# Patient Record
(demographics unavailable — no encounter records)

---

## 2025-03-19 NOTE — END OF VISIT
[FreeTextEntry3] : 30 year old male presented initially with cardiogenic shock and acute on chronic RH failure in setting of severe Group 1 PAH / PVOD 11/22/2024. Listed for bilateral lung transplant on 12/20/24. Underwent bilateral LT 1/31/2025. Discharged from index admission 3/17/25. Clinically doing well. Saturating well on room air.  Lung Transplant Recipient / Immunosuppression Tacrolimus 0.5 mg BID (Target 10 - 12) Cellcept 500 mg BID (reduced in setting of COVID). Can increase once RVP negative. Prednisone 17.5 mg daily TBBX 3/4/25 A0Bx, negative C4d DSA 3/4/25 - 0% Plan for CT suture removal at next visit  OI PPX: Bactrim, Valcyte (CMV negative 3/17/25), Posaconazole. Repeat monthly levels (4/7/25). COVID: Completed course of Remdesivir. Repeat RVP 3/24.  LTBI: INH/B6. Transplant ID follow up L IJ Clot: Last UE/LE Duplex negative 3/12/25. Eliquis 2.5 mg BID. Stop after 3 months (5/12/25) GERD: Aspiration precautions [Time Spent: ___ minutes] : I have spent [unfilled] minutes of time on the encounter which excludes teaching and separately reported services.

## 2025-03-19 NOTE — ASSESSMENT
[FreeTextEntry1] : 30yo male newly dx with PAH & RV failure, former smoker. S/P Bilateral Lung Transplant 1/31/2025   #IS GOAL 10-14  Tacrolimus 0.5 mg (Level 3/19/25: ****) - Prednisone 17.5mg for wound healing - Cellcept 750 mg BID  #OI - Valcyte 450mg BID - Bactrim SS daily -Posaconazole 200 mg BID  #GI Hx of GERD - Protonix 40 mg daily  #HEM Left IJ DVT Plan: discontinue after 3 months (5/13/25) - Eliquis 2.5 mg BID, started 2/13/25   #TB Latent TB therapy - Isoniazid 300 mg daily - Pyridoxine 50 mg daily       FOLLOW UP - Routine weekly labs 3/24/25 - DSA/Allosure to be drawn 4/9/25 - 3-Month bronch 4/17/25 - Vascular appointment pending - UE duplex to be repeated 5/13/25     All questions answered, used teach back method, patient verbalized understanding.   Above discussed with Dr. Brito

## 2025-03-19 NOTE — REASON FOR VISIT
[Follow-Up] : a follow-up visit [TextBox_44] : S/P Bilateral lung Transplant 1/3/25 [Family Member] : family member [Home] : at home, [unfilled] , at the time of the visit. [Medical Office: ([facility])___] : at the medical office located in  [Telehealth (audio & video)] : This visit was provided via telehealth using real-time 2-way audio visual technology. [Verbal consent obtained from patient] : the patient, [unfilled]

## 2025-03-19 NOTE — HISTORY OF PRESENT ILLNESS
[TextBox_4] : 31 year old male PVOD associated with right heart failure who presented with acute hypoxic respiratory failure and cardiogenic shock. Admitted to CCU and treated with Shankar and milrinone. Initiated on Epoprostenol  CT Chest showed centrilobular ground-glass nodules, interlobular septal thickening, and mediastinal lymphadenopathy. Course complicated by pneumothorax following placement of PAC. He underwent bronchoscopy and EBUS on 12/10/24. Lymph node negative for malignancy.   Following stabilization in the ICU patient was transferred to RCU 24. Tested positive for RSV 24 s/p course of Ribavirin. Evaluated by dental 24 for jaw pain and found to have inflamed gingiva around #17 distal and lingual, with generalized plaque accumulation and gross debris/plaque s/p irrigation and cleaning. No active infection noted. RRT on 24 in setting of difficulty with Flolan infusion running through PICC Line. Flolan was subsequently changed to R IJ but then became symptomatic with tachycardia, flushing and chest pain. Patient was transferred to MICU for further monitoring. Transferred back to RCU 24. Left anterior chest wall Pandya catheter placemed for Flolan infusion 24.  Listed:  Surgery: BILATERAL LUNG TRANSPLANT 25   Donor:  CMV + / EBV + / Toxo - , PHS risk Y/N Recipient: CMV +  / EBV +  / Toxo -  Induction: simluect 20mg x2,, medrol 1 g  Total Ischemic Time: RIGHT LUN" LEFT LUN" Extubated:25  Hospital Course: Bilateral lung transplant 25. Extubated on 25. Postoperative course notable for right main stem bronchus with necrotic material and persistent air leak requiring prolonged chest tube placement. Fluid cultures 25 grew penicillium and paecilomyces for which he is on posaconazole. Also developed an acute L IJ DVT treated with lose dose Eliquis. He also tested positive for COVID initially on 3/4/25 treated with a 5 day course of Remdesivir. He remained asymptomatic. Patient discharged home 3/17/25.  TELE-Visit 3/19/2025:  Initial visit is remote given positive COVID. He remains asymptomatic. Feels somewhat tired after climbing stairs at home. Also notes some chest tightness around the chest, which was present during his hospital stay. He is taking his medications as instructed using his pill box, which he had on him during the visit. He is taking strict aspiration precautions, which is also being enforced by his family. He is staying with his brother and designated caretaker Jayson, who is also present for the visit. He is ambulating daily without issue. Patient has vitals available for review, which were checked at home. Reported SpO2 97 - 99% on room air. /75. T 97.4 F. Wt 129 lbs.   DONOR CULTURES:  CHECK UNET POD 1,3,7,10,14 2025: respiratory cx: pseudomonas fluoresecens 25 blood cx: ngtd 25 R BAL: <10k staph aureus 25 L BAL: <10k staph aureus 25 urine cx: ngtd 25: bld cx: staph epi?? - I'm not seeing this on unet?  CULTURES: : OR: rare yeast, rare mold, + pseudomonas flurosecens : OR: candida parapsilosis, s/p  caspofungin : OR: rare penicillum  25 body fluid: rare candida, rare pseudomonas fluorescens 25 body fluid cx: rare candida parapsilosis, rare penicillium, rare paecilomyces 25: BAL: <10k growth 25 BAL: ngtd 25: NGTD 3/4/25: BAL + COVID (remdesivir 3/5 - 3/10) 3/7/25: RVP + COVID 3/13: RVP + COVID   TBBX: 1 MONTH: 3/4/25: A0Bx, C4D neg 3 MONTH: 2025 scheduled 8am 6 MONTH  9 MONTH  12 MONTH   DSA: 1 WEEK: (high risk): 25: 0%, NO DSA 2 WEEK: (high risk): 25: cPRA 0% NO DSA >2000 Late 1 month SENT 3/4/25; no Dsa, CPRA0% 3 MONTH: prior to  ** 6 MONTH  9 MONTH  12 MONTH  ALLOSURE: Late 1 month: SENT 3/4/25: 0.19% 3 MONTH: prior to  ** 6 MONTH  9 MONTH  12 MONTH   SPIROMETRY  inpatient	 FVC 2.1	   FEV1 1.82 2025 inpatient	 FVC 2.21   FEV1 2.08 2025 inpatient	 FVC 1.54   FEV1 1.16 2025 inpatient	 FVC 2.07    FEV1 2.05 3/3/2025	inpatient	FVC 1.94	   FEV1 1.77 3/10/2025 inpatient	 FVC 2.04    FEV1 1.85 3/13/2025 inpatient	 FVC 2.21    FEV1 2.06 3/17/2025 inpatient	 FVC 2.06    FEV1 1.94

## 2025-03-20 NOTE — ASSESSMENT
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 S/P Bilateral Lung Transplant 1/31/2025   #IS GOAL 10-14  Tacrolimus 0.5 mg BID (Level 3/24/25: ****) - Prednisone 17.5mg for wound healing - Cellcept 750 mg BID  #OI - Valcyte 450mg BID - Bactrim SS daily -Posaconazole 200 mg BID  #GI Hx of GERD - Protonix 40 mg daily  #HEM Left IJ DVT Plan: discontinue after 3 months (5/13/25) - Eliquis 2.5 mg BID, started 2/13/25   #TB Latent TB therapy - Isoniazid 300 mg daily - Pyridoxine 50 mg daily     FOLLOW UP - Routine weekly labs 3/31/2025 - [place] reviewed, ***** - DSA/Allosure to be drawn 4/9/25 - 3-Month bronch 4/17/25 - Vascular appointment pending - UE duplex to be repeated 5/13/25   RTC in 1 week with [place]  All questions answered, used teach back method, patient verbalized understanding.  Above discussed with Dr. Solorio

## 2025-03-20 NOTE — ASSESSMENT
[FreeTextEntry1] : 30yo male newly dx with PAH & RV failure, former smoker. S/P Bilateral Lung Transplant 1/31/2025   #IS GOAL 10-14  Tacrolimus 0.5 mg BID (Level 3/24/25: ****) - Prednisone 17.5mg for wound healing - Cellcept 750 mg BID  #OI - Valcyte 450mg BID - Bactrim SS daily -Posaconazole 200 mg BID  #GI Hx of GERD - Protonix 40 mg daily  #HEM Left IJ DVT Plan: discontinue after 3 months (5/13/25) - Eliquis 2.5 mg BID, started 2/13/25   #TB Latent TB therapy - Isoniazid 300 mg daily - Pyridoxine 50 mg daily     FOLLOW UP - Routine weekly labs 3/31/2025 - [place] reviewed, ***** - DSA/Allosure to be drawn 4/9/25 - 3-Month bronch 4/17/25 - Vascular appointment pending - UE duplex to be repeated 5/13/25   RTC in 1 week with [place]  All questions answered, used teach back method, patient verbalized understanding.  Above discussed with Dr. Solorio

## 2025-03-20 NOTE — HISTORY OF PRESENT ILLNESS
[TextBox_4] : 31 year old male PVOD associated with right heart failure who presented with acute hypoxic respiratory failure and cardiogenic shock. Admitted to CCU and treated with Shankar and milrinone. Initiated on Epoprostenol  CT Chest showed centrilobular ground-glass nodules, interlobular septal thickening, and mediastinal lymphadenopathy. Course complicated by pneumothorax following placement of PAC. He underwent bronchoscopy and EBUS on 12/10/24. Lymph node negative for malignancy.   Following stabilization in the ICU patient was transferred to RCU 24. Tested positive for RSV 24 s/p course of Ribavirin. Evaluated by dental 24 for jaw pain and found to have inflamed gingiva around #17 distal and lingual, with generalized plaque accumulation and gross debris/plaque s/p irrigation and cleaning. No active infection noted. RRT on 24 in setting of difficulty with Flolan infusion running through PICC Line. Flolan was subsequently changed to R IJ but then became symptomatic with tachycardia, flushing and chest pain. Patient was transferred to MICU for further monitoring. Transferred back to RCU 24. Left anterior chest wall Pandya catheter placemed for Flolan infusion 24.  Listed:  Surgery: BILATERAL LUNG TRANSPLANT 25   Donor:  CMV + / EBV + / Toxo - , PHS risk Y/N Recipient: CMV +  / EBV +  / Toxo -  Induction: simluect 20mg x2,, medrol 1 g  Total Ischemic Time: RIGHT LUN" LEFT LUN" Extubated:25  Hospital Course: Bilateral lung transplant 25. Extubated on 25. Postoperative course notable for right main stem bronchus with necrotic material and persistent air leak requiring prolonged chest tube placement. Fluid cultures 25 grew penicillium and paecilomyces for which he is on posaconazole. Also developed an acute L IJ DVT treated with lose dose Eliquis. He also tested positive for COVID initially on 3/4/25 treated with a 5 day course of Remdesivir. He remained asymptomatic. Patient discharged home 3/17/25.  TELE-Visit 3/19/2025: Initial visit is remote given positive COVID. He remains asymptomatic. Feels somewhat tired after climbing stairs at home. Also notes some chest tightness around the chest, which was present during his hospital stay. He is taking his medications as instructed using his pill box, which he had on him during the visit. He is taking strict aspiration precautions, which is also being enforced by his family. He is staying with his brother and designated caretaker Jayson, who is also present for the visit. He is ambulating daily without issue. Patient has vitals available for review, which were checked at home. Reported SpO2 97 - 99% on room air. /75. T 97.4 F. Wt 129 lbs.  CLINIC 3/26/2025: Patient presents for routine follow up appointment post hospital discharge. Reports******   DONOR CULTURES:  CHECK UNET POD 1,3,7,10,14 2025: respiratory cx: pseudomonas fluoresecens 25 blood cx: ngtd 25 R BAL: <10k staph aureus 25 L BAL: <10k staph aureus 25 urine cx: ngtd 25: bld cx: staph epi?? - I'm not seeing this on unet?  CULTURES: : OR: rare yeast, rare mold, + pseudomonas flurosecens : OR: candida parapsilosis, s/p  caspofungin : OR: rare penicillum  25 body fluid: rare candida, rare pseudomonas fluorescens 25 body fluid cx: rare candida parapsilosis, rare penicillium, rare paecilomyces 25: BAL: <10k growth 25 BAL: ngtd 25: NGTD 3/4/25: BAL + COVID (remdesivir 3/5 - 3/10) 3/7/25: RVP + COVID 3/13: RVP + COVID   TBBX: 1 MONTH: 3/4/25: A0Bx, C4D neg 3 MONTH: 2025 scheduled 8am 6 MONTH  9 MONTH  12 MONTH   DSA: 1 WEEK: (high risk): 25: 0%, NO DSA 2 WEEK: (high risk): 25: cPRA 0% NO DSA >2000 Late 1 month SENT 3/4/25; no Dsa, CPRA0% 3 MONTH: prior to  ** 6 MONTH  9 MONTH  12 MONTH  ALLOSURE: Late 1 month: SENT 3/4/25: 0.19% 3 MONTH: prior to  ** 6 MONTH  9 MONTH  12 MONTH   SPIROMETRY  inpatient	 FVC 2.1	   FEV1 1.82 2025 inpatient	 FVC 2.21   FEV1 2.08 2025 inpatient	 FVC 1.54   FEV1 1.16 2025 inpatient	 FVC 2.07    FEV1 2.05 3/3/2025	inpatient	FVC 1.94	   FEV1 1.77 3/10/2025 inpatient	 FVC 2.04    FEV1 1.85 3/13/2025 inpatient	 FVC 2.21    FEV1 2.06 3/17/2025 inpatient	 FVC 2.06    FEV1 1.94 3/26/2025 [facility]:   *************************

## 2025-03-30 NOTE — END OF VISIT
[FreeTextEntry3] : Seen and examined patient with NP/PA/ Fellow physician.  Independently verified the review of systems, physical examination, labs, radiological imaging. Also discussed with consultants to formulate eventual assessment and plan.    Assessment and plan:   Overall doing well from pulmonary perspective.  Asymptomatic.  Recently diagnosed COVID.  Spirometry stable.  Chest x-ray stable.  Tolerating transplant medications well. Sutures removed from chest tube sites Reports soreness in thyroid area-thyroid function normal.  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  Tylenol as needed  Matteo Sweeney MD, MPH  Lung Transplantation , Pulmonary and Critical care Medicine [facility]  [Time Spent: ___ minutes] : I have spent [unfilled] minutes of time on the encounter which excludes teaching and separately reported services.

## 2025-03-30 NOTE — ASSESSMENT
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 S/P Bilateral Lung Transplant 1/31/2025   #IS GOAL 10-14  Tacrolimus 0.5 mg BID (Level 3/24/25: 15.1) - Prednisone 17.5mg for wound healing - Cellcept 750 mg BID  #OI - Valcyte 450mg BID - Bactrim SS daily -Posaconazole 200 mg BID  #LUNG TXP - Culture 2/1/25 rare penicillium - Posa 200mg BID - Transplant ID appointment pending   #GI Hx of GERD - Protonix 40 mg daily  #HEM Left IJ DVT Plan: discontinue after 3 months (5/13/25) - Eliquis 2.5 mg BID, started 2/13/25   #TB Latent TB therapy - Isoniazid 300 mg daily - Pyridoxine 50 mg daily     FOLLOW UP - Routine weekly labs 3/31/2025 - [place] reviewed, improved -  Post COVID, DSA/Alllosre sent - DSA/Allosure to be repeated 4/16/25 - 3-Month bronch 4/17/25 - Vascular appointment pending - UE duplex to be repeated 5/13/25   RTC in 1 week with [place]  All questions answered, used teach back method, patient verbalized understanding.  Above discussed with Dr. Huston

## 2025-03-30 NOTE — END OF VISIT
[FreeTextEntry3] : Seen and examined patient with NP/PA/ Fellow physician.  Independently verified the review of systems, physical examination, labs, radiological imaging. Also discussed with consultants to formulate eventual assessment and plan.    Assessment and plan:   Overall doing well from pulmonary perspective.  Asymptomatic.  Recently diagnosed COVID.  Spirometry stable.  Chest x-ray stable.  Tolerating transplant medications well. Sutures removed from chest tube sites Reports soreness in thyroid area-thyroid function normal.  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  Tylenol as needed  Matteo Sweeney MD, MPH  Lung Transplantation , Pulmonary and Critical care Medicine [facility]  [Time Spent: ___ minutes] : I have spent [unfilled] minutes of time on the encounter which excludes teaching and separately reported services.

## 2025-03-30 NOTE — HISTORY OF PRESENT ILLNESS
[TextBox_4] : 31 year old male PVOD associated with right heart failure who presented with acute hypoxic respiratory failure and cardiogenic shock. Admitted to CCU and treated with Shankar and milrinone. Initiated on Epoprostenol  CT Chest showed centrilobular ground-glass nodules, interlobular septal thickening, and mediastinal lymphadenopathy. Course complicated by pneumothorax following placement of PAC. He underwent bronchoscopy and EBUS on 12/10/24. Lymph node negative for malignancy.   Following stabilization in the ICU patient was transferred to RCU 24. Tested positive for RSV 24 s/p course of Ribavirin. Evaluated by dental 24 for jaw pain and found to have inflamed gingiva around #17 distal and lingual, with generalized plaque accumulation and gross debris/plaque s/p irrigation and cleaning. No active infection noted. RRT on 24 in setting of difficulty with Flolan infusion running through PICC Line. Flolan was subsequently changed to R IJ but then became symptomatic with tachycardia, flushing and chest pain. Patient was transferred to MICU for further monitoring. Transferred back to RCU 24. Left anterior chest wall Pandya catheter placemed for Flolan infusion 24.  Listed:  Surgery: BILATERAL LUNG TRANSPLANT 25   Donor:  CMV + / EBV + / Toxo - , PHS risk Y/N Recipient: CMV +  / EBV +  / Toxo -  Induction: simluect 20mg x2,, medrol 1 g  Total Ischemic Time: RIGHT LUN" LEFT LUN" Extubated:25  Hospital Course: Bilateral lung transplant 25. Extubated on 25. Postoperative course notable for right main stem bronchus with necrotic material and persistent air leak requiring prolonged chest tube placement. Fluid cultures 25 grew penicillium and paecilomyces for which he is on posaconazole. Also developed an acute L IJ DVT treated with lose dose Eliquis. He also tested positive for COVID initially on 3/4/25 treated with a 5 day course of Remdesivir. He remained asymptomatic. Patient discharged home 3/17/25.  TELE-Visit 3/19/2025: Initial visit is remote given positive COVID. He remains asymptomatic. Feels somewhat tired after climbing stairs at home. Also notes some chest tightness around the chest, which was present during his hospital stay. He is taking his medications as instructed using his pill box, which he had on him during the visit. He is taking strict aspiration precautions, which is also being enforced by his family. He is staying with his brother and designated caretaker Jayson, who is also present for the visit. He is ambulating daily without issue. Patient has vitals available for review, which were checked at home. Reported SpO2 97 - 99% on room air. /75. T 97.4 F. Wt 129 lbs.  CLINIC 3/26/2025: Patient presents to clinic for routine follow up appointment post hospital discharge.  Recent COVID infection, last RVP negative, SPI improved, best to date, denies respiratory complications. Complained of sore throat/esophageal pain X 3 days. Thyroid assessed, appears normal, thyroid panel and DSA/Allosure drawn. Chest tube sutures removed, incisions healing well, no signs of infection noted. Medications reviewed with patient and brother, patient and brother verbalized understanding.    DONOR CULTURES:  CHECK UNET POD 1,3,7,10,14 2025: respiratory cx: pseudomonas fluoresecens 25 blood cx: ngtd 25 R BAL: <10k staph aureus 25 L BAL: <10k staph aureus 25 urine cx: ngtd 25: bld cx: staph epi?? - I'm not seeing this on unet?  CULTURES: : OR: rare yeast, rare mold, + pseudomonas flurosecens : OR: candida parapsilosis, s/p  caspofungin : OR: rare penicillum  25 body fluid: rare candida, rare pseudomonas fluorescens 25 body fluid cx: rare candida parapsilosis, rare penicillium, rare paecilomyces 25: BAL: <10k growth 25 BAL: ngtd 25: NGTD 3/4/25: BAL + COVID (remdesivir 3/5 - 3/10) 3/7/25: RVP + COVID 3/13: RVP + COVID   TBBX: 1 MONTH: 3/4/25: A0Bx, C4D neg 3 MONTH: 2025 scheduled 8am 6 MONTH  9 MONTH  12 MONTH   DSA: 1 WEEK: (high risk): 25: 0%, NO DSA 2 WEEK: (high risk): 25: cPRA 0% NO DSA >2000 Late 1 month SENT 3/4/25; no Dsa, CPRA0% Repeated 3/26: pending 3 MONTH:  6 MONTH  9 MONTH  12 MONTH  ALLOSURE: Late 1 month: SENT 3/4/25: 0.19% Repeat 3/26: pending 3 MONTH:   6 MONTH  9 MONTH  12 MONTH   SPIROMETRY  inpatient	 FVC 2.1	   FEV1 1.82 2025 inpatient	 FVC 2.21   FEV1 2.08 2025 inpatient	 FVC 1.54   FEV1 1.16 2025 inpatient	 FVC 2.07    FEV1 2.05 3/3/2025	inpatient	FVC 1.94	   FEV1 1.77 3/10/2025 inpatient	 FVC 2.04    FEV1 1.85 3/13/2025 inpatient	 FVC 2.21    FEV1 2.06 3/17/2025 inpatient	 FVC 2.06    FEV1 1.94 3/26/2025 [facility] FVC 2.98   FEV1 2.92

## 2025-03-30 NOTE — ASSESSMENT
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 S/P Bilateral Lung Transplant 1/31/2025   #IS GOAL 10-14  Tacrolimus 0.5 mg BID (Level 3/24/25: 15.1) - Prednisone 17.5mg for wound healing - Cellcept 750 mg BID  #OI - Valcyte 450mg BID - Bactrim SS daily -Posaconazole 200 mg BID  #LUNG TXP - Culture 2/1/25 rare penicillium - Posa 200mg BID - Transplant ID appointment pending   #GI Hx of GERD - Protonix 40 mg daily  #HEM Left IJ DVT Plan: discontinue after 3 months (5/13/25) - Eliquis 2.5 mg BID, started 2/13/25   #TB Latent TB therapy - Isoniazid 300 mg daily - Pyridoxine 50 mg daily     FOLLOW UP - Routine weekly labs 3/31/2025 - [place] reviewed, improved -  Post COVID, DSA/Alllosre sent - DSA/Allosure to be repeated 4/16/25 - 3-Month bronch 4/17/25 - Vascular appointment pending - UE duplex to be repeated 5/13/25   RTC in 1 week with [place]  All questions answered, used teach back method, patient verbalized understanding.  Above discussed with Dr. Huston

## 2025-03-30 NOTE — ASSESSMENT
[FreeTextEntry1] : 30yo male newly dx with PAH & RV failure, former smoker. S/P Bilateral Lung Transplant 1/31/2025   #IS GOAL 10-14  Tacrolimus 0.5 mg BID (Level 3/24/25: 15.1) - Prednisone 17.5mg for wound healing - Cellcept 750 mg BID  #OI - Valcyte 450mg BID - Bactrim SS daily -Posaconazole 200 mg BID  #LUNG TXP - Culture 2/1/25 rare penicillium - Posa 200mg BID - Transplant ID appointment pending   #GI Hx of GERD - Protonix 40 mg daily  #HEM Left IJ DVT Plan: discontinue after 3 months (5/13/25) - Eliquis 2.5 mg BID, started 2/13/25   #TB Latent TB therapy - Isoniazid 300 mg daily - Pyridoxine 50 mg daily     FOLLOW UP - Routine weekly labs 3/31/2025 - [place] reviewed, improved -  Post COVID, DSA/Alllosre sent - DSA/Allosure to be repeated 4/16/25 - 3-Month bronch 4/17/25 - Vascular appointment pending - UE duplex to be repeated 5/13/25   RTC in 1 week with [place]  All questions answered, used teach back method, patient verbalized understanding.  Above discussed with Dr. Huston

## 2025-03-30 NOTE — ASSESSMENT
[FreeTextEntry1] : 30yo male newly dx with PAH & RV failure, former smoker. S/P Bilateral Lung Transplant 1/31/2025   #IS GOAL 10-14  Tacrolimus 0.5 mg BID (Level 3/24/25: 15.1) - Prednisone 17.5mg for wound healing - Cellcept 750 mg BID  #OI - Valcyte 450mg BID - Bactrim SS daily -Posaconazole 200 mg BID  #LUNG TXP - Culture 2/1/25 rare penicillium - Posa 200mg BID - Transplant ID appointment pending   #GI Hx of GERD - Protonix 40 mg daily  #HEM Left IJ DVT Plan: discontinue after 3 months (5/13/25) - Eliquis 2.5 mg BID, started 2/13/25   #TB Latent TB therapy - Isoniazid 300 mg daily - Pyridoxine 50 mg daily     FOLLOW UP - Routine weekly labs 3/31/2025 - [place] reviewed, improved -  Post COVID, DSA/Alllosre sent - DSA/Allosure to be repeated 4/16/25 - 3-Month bronch 4/17/25 - Vascular appointment pending - UE duplex to be repeated 5/13/25   RTC in 1 week with [place]  All questions answered, used teach back method, patient verbalized understanding.  Above discussed with Dr. Huston

## 2025-03-30 NOTE — END OF VISIT
[FreeTextEntry3] : Seen and examined patient with NP/PA/ Fellow physician.  Independently verified the review of systems, physical examination, labs, radiological imaging. Also discussed with consultants to formulate eventual assessment and plan.    Assessment and plan:   Overall doing well from pulmonary perspective.  Asymptomatic.  Recently diagnosed COVID.  Spirometry stable.  Chest x-ray stable.  Tolerating transplant medications well. Sutures removed from chest tube sites Reports soreness in thyroid area-thyroid function normal.  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  Tylenol as needed  Matteo Sweeney MD, MPH  Lung Transplantation , Pulmonary and Critical care Medicine [facility]  [Time Spent: ___ minutes] : I have spent [unfilled] minutes of time on the encounter which excludes teaching and separately reported services.

## 2025-03-30 NOTE — END OF VISIT
[FreeTextEntry3] : Seen and examined patient with NP/PA/ Fellow physician.  Independently verified the review of systems, physical examination, labs, radiological imaging. Also discussed with consultants to formulate eventual assessment and plan.    Assessment and plan:   Overall doing well from pulmonary perspective.  Asymptomatic.  Recently diagnosed COVID.  Spirometry stable.  Chest x-ray stable.  Tolerating transplant medications well. Sutures removed from chest tube sites Reports soreness in thyroid area-thyroid function normal.  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  Tylenol as needed  Matteo Sweeney MD, MPH  Lung Transplantation , Pulmonary and Critical care Medicine [facility]  [Time Spent: ___ minutes] : I have spent [unfilled] minutes of time on the encounter which excludes teaching and separately reported services.

## 2025-04-05 NOTE — END OF VISIT
[FreeTextEntry3] : Seen and examined patient with NP/PA/ Fellow physician.  Independently verified the review of systems, physical examination, labs, radiological imaging. Also discussed with consultants to formulate eventual assessment and plan.    Assessment and plan:  Reports sore throat with several family members ill son with fever.  Home spirometry stable.  Chest x-ray stable.  Virus PCR with COVID virus and enterovirus rhinovirus.  COVID PCR likely persistence from previous infection.  Treat with prednisone taper and antibiotic.  Follow spirometry closely.  Tolerating medications well, but reports occasional tingling in feet  Thyroid area pain better  Family arrived from [facility]-feels happier  Return to clinic weekly  Matteo Sweeney MD, MPH  Lung Transplantation , Pulmonary and Critical care Medicine [facility] [Time Spent: ___ minutes] : I have spent [unfilled] minutes of time on the encounter which excludes teaching and separately reported services.

## 2025-04-05 NOTE — END OF VISIT
[FreeTextEntry3] : Seen and examined patient with NP/PA/ Fellow physician.  Independently verified the review of systems, physical examination, labs, radiological imaging. Also discussed with consultants to formulate eventual assessment and plan.    Assessment and plan:  Reports sore throat with several family members ill son with fever.  Home spirometry stable.  Chest x-ray stable.  Virus PCR with COVID virus and enterovirus rhinovirus.  COVID PCR likely persistence from previous infection.  Treat with prednisone taper and antibiotic.  Follow spirometry closely.  Tolerating medications well, but reports occasional tingling in feet  Thyroid area pain better  Family arrived from [facility]-feels happier  Return to clinic weekly  Matteo Sweeney MD, MPH  Lung Transplantation , Pulmonary and Critical care Medicine [facility] [Time Spent: ___ minutes] : I have spent [unfilled] minutes of time on the encounter which excludes teaching and separately reported services.

## 2025-04-05 NOTE — ASSESSMENT
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 S/P Bilateral Lung Transplant 1/31/2025   #IS GOAL 10-14  Tacrolimus 0.5 mg BID (Level 3/31/25: 13.2) - Prednisone 17.5mg for wound healing - Cellcept 750 mg BID  #OI - Valcyte 450mg BID - Bactrim SS daily -Posaconazole 200 mg BID  #LUNG TXP - Culture 2/1/25 rare penicillium - Posa 200mg BID - Transplant ID appointment pending   #GI Hx of GERD - Protonix 40 mg daily  #HEM Left IJ DVT Plan: discontinue after 3 months (5/13/25) - Eliquis 2.5 mg BID, started 2/13/25   #TB Latent TB therapy - Isoniazid 300 mg daily - Pyridoxine 50 mg daily     FOLLOW UP - Routine weekly labs 4/7/2025 - [place] reviewed, slight decline - DSA/Allosure to be repeated 4/16/25 - 3-Month bronch 4/17/25 - Vascular appointment pending - UE duplex to be repeated 5/13/25   RTC in 1 week with Mendez& CXR  All questions answered, used teach back method, patient verbalized understanding.  Above discussed with Dr. Huston

## 2025-04-05 NOTE — HISTORY OF PRESENT ILLNESS
[TextBox_4] : 31 year old male PVOD associated with right heart failure who presented with acute hypoxic respiratory failure and cardiogenic shock. Admitted to CCU and treated with Shankar and milrinone. Initiated on Epoprostenol  CT Chest showed centrilobular ground-glass nodules, interlobular septal thickening, and mediastinal lymphadenopathy. Course complicated by pneumothorax following placement of PAC. He underwent bronchoscopy and EBUS on 12/10/24. Lymph node negative for malignancy.   Following stabilization in the ICU patient was transferred to RCU 24. Tested positive for RSV 24 s/p course of Ribavirin. Evaluated by dental 24 for jaw pain and found to have inflamed gingiva around #17 distal and lingual, with generalized plaque accumulation and gross debris/plaque s/p irrigation and cleaning. No active infection noted. RRT on 24 in setting of difficulty with Flolan infusion running through PICC Line. Flolan was subsequently changed to R IJ but then became symptomatic with tachycardia, flushing and chest pain. Patient was transferred to MICU for further monitoring. Transferred back to RCU 24. Left anterior chest wall Pandya catheter placemed for Flolan infusion 24.  Listed:  Surgery: BILATERAL LUNG TRANSPLANT 25   Donor:  CMV + / EBV + / Toxo - , PHS risk Y/N Recipient: CMV +  / EBV +  / Toxo -  Induction: simluect 20mg x2,, medrol 1 g  Total Ischemic Time: RIGHT LUN" LEFT LUN" Extubated:25  Hospital Course: Bilateral lung transplant 25. Extubated on 25. Postoperative course notable for right main stem bronchus with necrotic material and persistent air leak requiring prolonged chest tube placement. Fluid cultures 25 grew penicillium and paecilomyces for which he is on posaconazole. Also developed an acute L IJ DVT treated with lose dose Eliquis. He also tested positive for COVID initially on 3/4/25 treated with a 5 day course of Remdesivir. He remained asymptomatic. Patient discharged home 3/17/25.  TELE-Visit 3/19/2025: Initial visit is remote given positive COVID. He remains asymptomatic. Feels somewhat tired after climbing stairs at home. Also notes some chest tightness around the chest, which was present during his hospital stay. He is taking his medications as instructed using his pill box, which he had on him during the visit. He is taking strict aspiration precautions, which is also being enforced by his family. He is staying with his brother and designated caretaker Jayson, who is also present for the visit. He is ambulating daily without issue. Patient has vitals available for review, which were checked at home. Reported SpO2 97 - 99% on room air. /75. T 97.4 F. Wt 129 lbs.  CLINIC 3/26/2025: Patient presents to clinic for routine follow up appointment post hospital discharge.  Recent COVID infection, last RVP negative, SPI improved, best to date, denies respiratory complications. Complained of sore throat/esophageal pain X 3 days. Thyroid assessed, appears normal, thyroid panel and DSA/Allosure drawn. Chest tube sutures removed, incisions healing well, no signs of infection noted. Medications reviewed with patient and brother, patient and brother verbalized understanding.   CLINIC 25: Slight decline in spirometry, not concerning as home sandy trend is stable, DSA/Allosure repeated for further surveillance. Patient complained of sore thrat X1-2 days, denies sick contact, RVP swab sent. Patient also complained of numbness and tingling in hands and fingers, will reassess next week and consider adding gabapentin.  IS/Oi medications are stable; no changes this week. Routine labs to be repeated on Monday    DONOR CULTURES:  CHECK UNET POD 1,3,7,10,14 2025: respiratory cx: pseudomonas fluoresecens 25 blood cx: ngtd 25 R BAL: <10k staph aureus 25 L BAL: <10k staph aureus 25 urine cx: ngtd 25: bld cx: staph epi?? - I'm not seeing this on unet?  CULTURES: : OR: rare yeast, rare mold, + pseudomonas flurosecens : OR: candida parapsilosis, s/p  caspofungin : OR: rare penicillum  25 body fluid: rare candida, rare pseudomonas fluorescens 25 body fluid cx: rare candida parapsilosis, rare penicillium, rare paecilomyces 25: BAL: <10k growth 25 BAL: ngtd 25: NGTD 3/4/25: BAL + COVID (remdesivir 3/5 - 3/10) 3/7/25: RVP + COVID 3/13: RVP + COVID   TBBX: 1 MONTH: 3/4/25: A0Bx, C4D neg 3 MONTH: 2025 scheduled 8am 6 MONTH  9 MONTH  12 MONTH   DSA: 1 WEEK: (high risk): 25: 0%, NO DSA 2 WEEK: (high risk): 25: cPRA 0% NO DSA >2000 Late 1 month SENT 3/4/25; no Dsa, CPRA0% Repeated 3/26: 0% 3 MONTH:  6 MONTH  9 MONTH  12 MONTH  ALLOSURE: Late 1 month: SENT 3/4/25: 0.19% Repeat 3/26: 0.06% Repeated 2025 3 MONTH:   6 MONTH  9 MONTH  12 MONTH   SPIROMETRY  inpatient	 FVC 2.1	   FEV1 1.82 2025 inpatient	 FVC 2.21   FEV1 2.08 2025 inpatient	 FVC 1.54   FEV1 1.16 2025 inpatient	 FVC 2.07    FEV1 2.05 3/3/2025	inpatient	FVC 1.94	   FEV1 1.77 3/10/2025 inpatient	 FVC 2.04    FEV1 1.85 3/13/2025 inpatient	 FVC 2.21    FEV1 2.06 3/17/2025 inpatient	 FVC 2.06    FEV1 1.94 3/26/2025 [facility] FVC 2.98   FEV1 2.92 2025 [facility]   FVC 2.70   FEV1 2.63

## 2025-04-05 NOTE — ASSESSMENT
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 S/P Bilateral Lung Transplant 1/31/2025   #IS GOAL 10-14  Tacrolimus 0.5 mg BID (Level 3/31/25: 13.2) - Prednisone 17.5mg for wound healing - Cellcept 750 mg BID  #OI - Valcyte 450mg BID - Bactrim SS daily -Posaconazole 200 mg BID  #LUNG TXP - Culture 2/1/25 rare penicillium - Posa 200mg BID - Transplant ID appointment pending   #GI Hx of GERD - Protonix 40 mg daily  #HEM Left IJ DVT Plan: discontinue after 3 months (5/13/25) - Eliquis 2.5 mg BID, started 2/13/25   #TB Latent TB therapy - Isoniazid 300 mg daily - Pyridoxine 50 mg daily     FOLLOW UP - Routine weekly labs 4/7/2025 - [place] reviewed, slight decline - DSA/Allosure to be repeated 4/16/25 - 3-Month bronch 4/17/25 - Vascular appointment pending - UE duplex to be repeated 5/13/25   RTC in 1 week with Mendez& CXR  All questions answered, used teach back method, patient verbalized understanding.  Above discussed with Dr. Huston

## 2025-04-05 NOTE — END OF VISIT
[FreeTextEntry3] : Seen and examined patient with NP/PA/ Fellow physician.  Independently verified the review of systems, physical examination, labs, radiological imaging. Also discussed with consultants to formulate eventual assessment and plan.    Assessment and plan:  Reports sore throat with several family members ill son with fever.  Home spirometry stable.  Chest x-ray stable.  Virus PCR with COVID virus and enterovirus rhinovirus.  COVID PCR likely persistence from previous infection.  Treat with prednisone taper and antibiotic.  Follow spirometry closely.  Tolerating medications well, but reports occasional tingling in feet  Thyroid area pain better  Family arrived from [facility]-feels happier  Return to clinic weekly  Matteo Sweeney MD, MPH  Lung Transplantation , Pulmonary and Critical care Medicine [facility] [Time Spent: ___ minutes] : I have spent [unfilled] minutes of time on the encounter which excludes teaching and separately reported services.

## 2025-04-05 NOTE — ASSESSMENT
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 S/P Bilateral Lung Transplant 1/31/2025   #IS GOAL 10-14  Tacrolimus 0.5 mg BID (Level 3/31/25: 13.2) - Prednisone 17.5mg for wound healing - Cellcept 750 mg BID  #OI - Valcyte 450mg BID - Bactrim SS daily -Posaconazole 200 mg BID  #LUNG TXP - Culture 2/1/25 rare penicillium - Posa 200mg BID - Transplant ID appointment pending   #GI Hx of GERD - Protonix 40 mg daily  #HEM Left IJ DVT Plan: discontinue after 3 months (5/13/25) - Eliquis 2.5 mg BID, started 2/13/25   #TB Latent TB therapy - Isoniazid 300 mg daily - Pyridoxine 50 mg daily     FOLLOW UP - Routine weekly labs 4/7/2025 - [place] reviewed, slight decline - DSA/Allosure to be repeated 4/16/25 - 3-Month bronch 4/17/25 - Vascular appointment pending - UE duplex to be repeated 5/13/25   RTC in 1 week with Mendez& CXR  All questions answered, used teach back method, patient verbalized understanding.  Above discussed with Dr. Huston

## 2025-04-09 NOTE — ASSESSMENT
[FreeTextEntry1] : Assessment: 1. L IJ DVT 2. Bilateral lung transplant on 1/31/25  Plan: 1. L IJ DVT Last UE venous duplex improved. Continue Eliquis 2.5 mg BID. Repeat UE venous duplex in 3 months. LE venous duplex negative. 2. Health Maintenance: Healthy diet and exercise. 3. Bilateral lung transplant on 1/31/25 Continue excellent care with Transplant team. 4. Follow-up in 6 months. Call with any questions. Office will call with test results.

## 2025-04-09 NOTE — REASON FOR VISIT
[Initial Evaluation] : an initial evaluation of [FreeTextEntry2] : VTE [FreeTextEntry1] : 4/9/2025  31-year-old Pleasant male s/p bilateral lung transplant on 1/31/25.   Developed an acute L IJ DVT treated with lose dose Eliquis 2.5 mg BID.  Last UE venous duplex 3/2025: Synechia in the left internal jugular vein are likely the residue of prior lines. No acute venous thrombosis  LE venous duplex negative 3/2025: No DVT.  Patient without UE symptoms. No LE edema.

## 2025-04-09 NOTE — ASSESSMENT
[FreeTextEntry1] : Patient is a 31 year old male with PMH of b/l Lung Transplant on 25 (CMV +/+, EBV +/+, Toxo -/-) on tacrolimus, mycophenolate and prednisone with prolonged hospital stay at [facility] 24 until 3/17/25 during the hospitalization patient was found to have bronchial cultures from 24 growing Hypoxylon, latent TB started (positive quantiferon though negative Tspot) on INH 24, COVID 19 3/4/25 (treated with 5 days of remdesivir 3/5 - 3/9/25), RSV infection 24 (treated with Ribavirin  - 25), and bronchial cultures from 25 growing Candida Parapsilosis (pansensitive), Penicillium, Paecilomyces and Pseduomonas Fluorences (pan sensitive), Fungitell 113 on  with follow ups negative and negative Pneumocystis PCR, positive galactomannan 1.15 with repeat on 3/4 negative.  Hospital admissions 24 -3/17/25:  initial lung transplant admission. found to have bronchial cultures from 24 growing Hypoxylon, latent TB started (positive quantiferon though negative Tspot) on INH 24, COVID 19 3/4/25 (treated with 5 days of remdesivir 3/5 - 3/9/25), RSV infection 24 (treated with Ribavirin  - 25), LIJ DVT on US from 25 and bronchial cultures from 25 growing Candida Parapsilosis (pansensitive), Penicillium, Paecilomyces and Pseduomonas Fluorences (pan sensitive), Fungitell 113 on  with follow ups negative and negative Pneumocystis PCR, positive galactomannan 1.15 with repeat on 3/4 negative  Medications: caspofungin  - 25 isavuconazonium  - 25 zosyn  - 24,  - 25, 2/10 - 25 Posaconazole 24 - ,  - ,  - 3/17 200mg BID (posa level  0.8 and 3/3 1.7) vancomycin IV  - 2/10/25 doxycycline  - 25 Vanganciclovir  - 3/17/25 TMP/SMX PPx:  - 3/17/25  Important imagin24: Abdominal US negative 1/15/25 Renal Bladder US: negative 25: US carotids : LIJ DVT 25: CT CAP: R sided ground glass, RLL nodular opacity and no abdominal pelvis pathology  3/17/25: CXR clear lungs   Assessments and Plan: *B/l Lung Transplant on 25 (CMV +/+, EBV +/+, Toxo -/-) on tacrolimus, mycophenolate and prednisone, patient extubated 25 - CBC - CMP - EBV viral load  - CMV viral load - continue Valganciclovir 450mg PO BID  - continue TMP/SMX 80/400 PO QD for PJP PPx - immunosuppression per primary transplant team   *Latent TB (started on therapy 24), positive quantiferon 12/3/24, negative T spot - continue INH with vitamin B6, likely will need treatment through 8/3/25 to complete 9 months of therapy   *Sore throat  and cough without lymphadenopathy or purulence with sick contact of his 9 month son, likely viral etiology - full RVP - EBV viral load  - CMV viral load  *Hx of Fungal Lung cultures positive bronchial cultures from 25 growing Candida Parapsilosis (pansensitive), Penicillium, and Paecilomyces, Fungitell 113 on  with follow ups negative and negative PJP PCR, positive galactomannan 1.15 with repeat on 3/4 negative - continue Posaconazole 200mg PO BID  - posaconazole level (last level 3/3 1.7) - repeat fungitell - repeat galactomannan  *AFB culture from 12/10/24 growing Hypoxylon, this is a tree/environmental mold which may not be clinically significant especially as it was only found on one culture, though patient currently on treatment posa - continue posaconazole - if patient undergoes bronchoscopy would obtain repeat AFB and fungal cultures   *Pseudomonas Fluorecens (pan sensitive) in cultures from 25 s/p zosyn (length of courses above) - no further treatment at this time  *Hx of COVID 19 3/4/25 (treated with 5 days of remdesivir 3/5 - 3/9/25), RSV infection 24 (treated with Ribavirin  - 25) - no indication for further treatment   *LIJ DVT  - anticoagulation per primary transplant team   *HCM - defer vaccination at this time as patient will unlikely to clinically to respond to them due to immunosuppressed state  Follow up: 2 weeks to assure clinical improvement from likely viral illness, may change depending on results

## 2025-04-09 NOTE — END OF VISIT
[] : Fellow [FreeTextEntry3] : I reviewed the case with the fellow and then independently saw the patient for focused history, examination, and counseling. I edited the note above.  Patient has new URI symptoms in the last 24hrs with a sick contact (his 9 month old son has fever and URI symptoms today).   Complete RVP done.  Will reach out if there is anything for intervention.  This is a return patient as he was followed extensively by Transplant ID in the hospital. Level 4 billing.  Addressed multiple problems, reviewed > 3 lab results, ordered > 3 labs, medication toxicity monitoring.

## 2025-04-09 NOTE — ASSESSMENT
[FreeTextEntry1] : Patient is a 31 year old male with PMH of b/l Lung Transplant on 25 (CMV +/+, EBV +/+, Toxo -/-) on tacrolimus, mycophenolate and prednisone with prolonged hospital stay at [facility] 24 until 3/17/25 during the hospitalization patient was found to have bronchial cultures from 24 growing Hypoxylon, latent TB started (positive quantiferon though negative Tspot) on INH 24, COVID 19 3/4/25 (treated with 5 days of remdesivir 3/5 - 3/9/25), RSV infection 24 (treated with Ribavirin  - 25), and bronchial cultures from 25 growing Candida Parapsilosis (pansensitive), Penicillium, Paecilomyces and Pseduomonas Fluorences (pan sensitive), Fungitell 113 on  with follow ups negative and negative Pneumocystis PCR, positive galactomannan 1.15 with repeat on 3/4 negative.  Hospital admissions 24 -3/17/25:  initial lung transplant admission. found to have bronchial cultures from 24 growing Hypoxylon, latent TB started (positive quantiferon though negative Tspot) on INH 24, COVID 19 3/4/25 (treated with 5 days of remdesivir 3/5 - 3/9/25), RSV infection 24 (treated with Ribavirin  - 25), LIJ DVT on US from 25 and bronchial cultures from 25 growing Candida Parapsilosis (pansensitive), Penicillium, Paecilomyces and Pseduomonas Fluorences (pan sensitive), Fungitell 113 on  with follow ups negative and negative Pneumocystis PCR, positive galactomannan 1.15 with repeat on 3/4 negative  Medications: caspofungin  - 25 isavuconazonium  - 25 zosyn  - 24,  - 25, 2/10 - 25 Posaconazole 24 - ,  - ,  - 3/17 200mg BID (posa level  0.8 and 3/3 1.7) vancomycin IV  - 2/10/25 doxycycline  - 25 Vanganciclovir  - 3/17/25 TMP/SMX PPx:  - 3/17/25  Important imagin24: Abdominal US negative 1/15/25 Renal Bladder US: negative 25: US carotids : LIJ DVT 25: CT CAP: R sided ground glass, RLL nodular opacity and no abdominal pelvis pathology  3/17/25: CXR clear lungs   Assessments and Plan: *B/l Lung Transplant on 25 (CMV +/+, EBV +/+, Toxo -/-) on tacrolimus, mycophenolate and prednisone, patient extubated 25 - CBC - CMP - EBV viral load  - CMV viral load - continue Valganciclovir 450mg PO BID  - continue TMP/SMX 80/400 PO QD for PJP PPx - immunosuppression per primary transplant team   *Latent TB (started on therapy 24), positive quantiferon 12/3/24, negative T spot - continue INH with vitamin B6, likely will need treatment through 8/3/25 to complete 9 months of therapy   *Sore throat  and cough without lymphadenopathy or purulence with sick contact of his 9 month son, likely viral etiology - full RVP - EBV viral load  - CMV viral load  *Hx of Fungal Lung cultures positive bronchial cultures from 25 growing Candida Parapsilosis (pansensitive), Penicillium, and Paecilomyces, Fungitell 113 on  with follow ups negative and negative PJP PCR, positive galactomannan 1.15 with repeat on 3/4 negative - continue Posaconazole 200mg PO BID  - posaconazole level (last level 3/3 1.7) - repeat fungitell - repeat galactomannan  *AFB culture from 12/10/24 growing Hypoxylon, this is a tree/environmental mold which may not be clinically significant especially as it was only found on one culture, though patient currently on treatment posa - continue posaconazole - if patient undergoes bronchoscopy would obtain repeat AFB and fungal cultures   *Pseudomonas Fluorecens (pan sensitive) in cultures from 25 s/p zosyn (length of courses above) - no further treatment at this time  *Hx of COVID 19 3/4/25 (treated with 5 days of remdesivir 3/5 - 3/9/25), RSV infection 24 (treated with Ribavirin  - 25) - no indication for further treatment   *LIJ DVT  - anticoagulation per primary transplant team   *HCM - defer vaccination at this time as patient will unlikely to clinically to respond to them due to immunosuppressed state  Follow up: 2 weeks to assure clinical improvement from likely viral illness, may change depending on results

## 2025-04-09 NOTE — HISTORY OF PRESENT ILLNESS
[TextBox_4] : 31 year old male PVOD associated with right heart failure who presented with acute hypoxic respiratory failure and cardiogenic shock. Admitted to CCU and treated with Shankar and milrinone. Initiated on Epoprostenol  CT Chest showed centrilobular ground-glass nodules, interlobular septal thickening, and mediastinal lymphadenopathy. Course complicated by pneumothorax following placement of PAC. He underwent bronchoscopy and EBUS on 12/10/24. Lymph node negative for malignancy.   Following stabilization in the ICU patient was transferred to RCU 24. Tested positive for RSV 24 s/p course of Ribavirin. Evaluated by dental 24 for jaw pain and found to have inflamed gingiva around #17 distal and lingual, with generalized plaque accumulation and gross debris/plaque s/p irrigation and cleaning. No active infection noted. RRT on 24 in setting of difficulty with Flolan infusion running through PICC Line. Flolan was subsequently changed to R IJ but then became symptomatic with tachycardia, flushing and chest pain. Patient was transferred to MICU for further monitoring. Transferred back to RCU 24. Left anterior chest wall Pandya catheter placemed for Flolan infusion 24.  Listed:  Surgery: BILATERAL LUNG TRANSPLANT 25   Donor:  CMV + / EBV + / Toxo - , PHS risk Y/N Recipient: CMV +  / EBV +  / Toxo -  Induction: simluect 20mg x2,, medrol 1 g  Total Ischemic Time: RIGHT LUN" LEFT LUN" Extubated:25  Hospital Course: Bilateral lung transplant 25. Extubated on 25. Postoperative course notable for right main stem bronchus with necrotic material and persistent air leak requiring prolonged chest tube placement. Fluid cultures 25 grew penicillium and paecilomyces for which he is on posaconazole. Also developed an acute L IJ DVT treated with lose dose Eliquis. He also tested positive for COVID initially on 3/4/25 treated with a 5 day course of Remdesivir. He remained asymptomatic. Patient discharged home 3/17/25.  TELE-Visit 3/19/2025: Initial visit is remote given positive COVID. He remains asymptomatic. Feels somewhat tired after climbing stairs at home. Also notes some chest tightness around the chest, which was present during his hospital stay. He is taking his medications as instructed using his pill box, which he had on him during the visit. He is taking strict aspiration precautions, which is also being enforced by his family. He is staying with his brother and designated caretaker Jayson, who is also present for the visit. He is ambulating daily without issue. Patient has vitals available for review, which were checked at home. Reported SpO2 97 - 99% on room air. /75. T 97.4 F. Wt 129 lbs.  CLINIC 3/26/2025: Patient presents to clinic for routine follow up appointment post hospital discharge.  Recent COVID infection, last RVP negative, SPI improved, best to date, denies respiratory complications. Complained of sore throat/esophageal pain X 3 days. Thyroid assessed, appears normal, thyroid panel and DSA/Allosure drawn. Chest tube sutures removed, incisions healing well, no signs of infection noted. Medications reviewed with patient and brother, patient and brother verbalized understanding.   CLINIC 25: Slight decline in spirometry, not concerning as home sandy trend is stable, DSA/Allosure repeated for further surveillance. Patient complained of sore thrat X1-2 days, denies sick contact, RVP swab sent. Patient also complained of numbness and tingling in hands and fingers, will reassess next week and consider adding gabapentin.  IS/Oi medications are stable; no changes this week. Routine labs to be repeated on Monday  CLINIC 25: RVP 4/3 + enterovirus and COVID. Started on a prednisone taper starting at 40 mg daily. Has further decline in spirometry from 2.63 last week to 2.49 today. Allosure was checked 3/26/25 and was 0.06%. DSA 0%. Has mild sore throat. No fevers. No shortness of breath. Patient's brother accompanied him today. Patient reports compliance with medications. His brother states that he is trying to encourage more physical activity.  CLINIC 2025: Patient presents for routine follow up appointment. Reports *****    DONOR CULTURES:  CHECK UNET POD 1,3,7,10,14 2025: respiratory cx: pseudomonas fluoresecens 25 blood cx: ngtd 25 R BAL: <10k staph aureus 25 L BAL: <10k staph aureus 25 urine cx: ngtd 25: bld cx: staph epi?? - I'm not seeing this on unet?  CULTURES: : OR: rare yeast, rare mold, + pseudomonas flurosecens : OR: candida parapsilosis, s/p  caspofungin : OR: rare penicillum  25 body fluid: rare candida, rare pseudomonas fluorescens 25 body fluid cx: rare candida parapsilosis, rare penicillium, rare paecilomyces 25: BAL: <10k growth 25 BAL: ngtd 25: NGTD 3/4/25: BAL + COVID (remdesivir 3/5 - 3/10) 3/7/25: RVP + COVID 3/13: RVP + COVID   TBBX: 1 MONTH: 3/4/25: A0Bx, C4D neg 3 MONTH: 4/15/2025 scheduled 8am 6 MONTH  9 MONTH  12 MONTH   DSA: 1 WEEK: (high risk): 25: 0%, NO DSA 2 WEEK: (high risk): 25: cPRA 0% NO DSA >2000 Late 1 month SENT 3/4/25; no Dsa, CPRA0% Repeated 3/26: 0% 3 MONTH:  6 MONTH  9 MONTH  12 MONTH  ALLOSURE: Late 1 month: SENT 3/4/25: 0.19% Repeat 3/26: 0.06% Repeated 2025 3 MONTH:   6 MONTH  9 MONTH  12 MONTH   SPIROMETRY  inpatient	 FVC 2.1	    FEV1 1.82 2025 inpatient	 FVC 2.21    FEV1 2.08 2025 inpatient	 FVC 1.54    FEV1 1.16 2025 inpatient	 FVC 2.07    FEV1 2.05 3/3/2025	inpatient	 FVC 1.94    FEV1 1.77 3/10/2025 inpatient	 FVC 2.04    FEV1 1.85 3/13/2025 inpatient	 FVC 2.21    FEV1 2.06 3/17/2025 inpatient	 FVC 2.06    FEV1 1.94 3/26/2025 [facility]: FVC 2.98   FEV1 2.92 2025  [facility]:  FVC 2.70   FEV1 2.63 2025  [facility]:  FVC 2.61   FEV1 2.49 2025  [facility]:  *******************

## 2025-04-09 NOTE — ASSESSMENT
[FreeTextEntry1] : 30yo male newly dx with PAH & RV failure, former smoker. S/P Bilateral Lung Transplant 1/31/2025   #IS GOAL 10-12  Tacrolimus 0.5 mg BID, 0.5 mg QD alternating (Level 4/7/25: 17.3) - Prednisone 17.5mg for wound healing - Cellcept 750 mg daily  #OI - Valcyte 450mg BID - Bactrim SS daily - Posaconazole 200 mg BID  #LUNG TXP - Culture 2/1/25 rare penicillium - Posa 200mg BID - Transplant ID appointment pending   #GI Hx of GERD - Protonix 40 mg daily  #HEM Left IJ DVT Plan: discontinue after 3 months (5/13/25) - Eliquis 2.5 mg BID, started 2/13/25   #TB Latent TB therapy - Isoniazid 300 mg daily - Pyridoxine 50 mg daily     FOLLOW UP - Routine weekly labs 4/21/2025 - Mendez reviewed, **** - DSA/Allosure for 4/16/25 - 3-Month bronch 4/15/25 - Vascular appointment pending - UE duplex to be repeated 5/13/25 - Continue to follow up with Transplant ID.    RTC in 1 week with Mendez & CXR  All questions answered, used teach back method, patient verbalized understanding.  Above discussed with Dr. Solorio

## 2025-04-09 NOTE — PHYSICAL EXAM
[General Appearance - Well Developed] : well developed [Normal Appearance] : normal appearance [Well Groomed] : well groomed [General Appearance - Well Nourished] : well nourished [No Deformities] : no deformities [General Appearance - In No Acute Distress] : no acute distress [Normal Conjunctiva] : the conjunctiva exhibited no abnormalities [Eyelids - No Xanthelasma] : the eyelids demonstrated no xanthelasmas [Normal Oral Mucosa] : normal oral mucosa [No Oral Pallor] : no oral pallor [No Oral Cyanosis] : no oral cyanosis [Normal Jugular Venous A Waves Present] : normal jugular venous A waves present [Normal Jugular Venous V Waves Present] : normal jugular venous V waves present [No Jugular Venous Glover A Waves] : no jugular venous glover A waves [Heart Rate And Rhythm] : heart rate and rhythm were normal [Heart Sounds] : normal S1 and S2 [Murmurs] : no murmurs present [Respiration, Rhythm And Depth] : normal respiratory rhythm and effort [Exaggerated Use Of Accessory Muscles For Inspiration] : no accessory muscle use [Auscultation Breath Sounds / Voice Sounds] : lungs were clear to auscultation bilaterally [Abdomen Soft] : soft [Abdomen Tenderness] : non-tender [Abdomen Mass (___ Cm)] : no abdominal mass palpated [Abnormal Walk] : normal gait [Gait - Sufficient For Exercise Testing] : the gait was sufficient for exercise testing [Nail Clubbing] : no clubbing of the fingernails [Cyanosis, Localized] : no localized cyanosis [Petechial Hemorrhages (___cm)] : no petechial hemorrhages [FreeTextEntry1] : No UE edema, No LE edema  [Skin Color & Pigmentation] : normal skin color and pigmentation [] : no rash [No Venous Stasis] : no venous stasis [Skin Lesions] : no skin lesions [No Skin Ulcers] : no skin ulcer [No Xanthoma] : no  xanthoma was observed [Oriented To Time, Place, And Person] : oriented to person, place, and time [Affect] : the affect was normal [Mood] : the mood was normal [No Anxiety] : not feeling anxious

## 2025-04-09 NOTE — REVIEW OF SYSTEMS
[Chills] : chills [Feeling Tired] : feeling tired [Sore Throat] : sore throat [Cough] : cough [Negative] : Heme/Lymph [Fever] : no fever [Body Aches] : no body aches [Difficulty Sleeping] : no difficulty sleeping [Feeling Sick] : not feeling sick [Earache] : no earache [Loss Of Hearing] : no hearing loss [Nasal Discharge] : no nasal discharge [Hoarseness] : no hoarseness [Shortness Of Breath] : no shortness of breath [Wheezing] : no wheezing [SOB on Exertion] : no shortness of breath during exertion [Sputum] : not coughing up ~M sputum [Pleuritic Chest Pain] : no pleuritic chest pain [FreeTextEntry6] : occasional pleuritic chest pain

## 2025-04-09 NOTE — HISTORY OF PRESENT ILLNESS
[FreeTextEntry1] : Patient is a 31 year old male with PMH of b/l Lung Transplant on 1/31/25 (CMV +/+, EBV +/+, Toxo -/-) on tacrolimus, mycophenolate and prednisone with prolonged hospital stay at [facility] 11/22/24 until 3/17/25 during the hospitalization patient was found to have bronchial cultures from 12/12/24 growing Hypoxylon, latent TB started (positive quantiferon though negative Tspot) on INH 12/19/24, COVID 19 3/4/25 (treated with 5 days of remdesivir 3/5 - 3/9/25), RSV infection 12/23/24 (treated with Ribavirin 12/23 - 12/28/25), and bronchial cultures from 2/1/25 growing Candida Parapsilosis (pansensitive), Penicillium, Paecilomyces and Pseduomonas Fluorences (pan sensitive), Fungitell 113 on 2/2 with follow ups negative and negative PJP PCR, positive galactomannan 1.15 with repeat on 3/4 negative. Patient here for follow up. He states that he developed a sore throat. He denies fever or chills and does admit to a little cough. He has been compliant with his posaconazole 200mg PO BID, valganciclovir 450mg PO BID and bactrim 400/80 PO QD without any missed doses. He also has been compliant with his INH/Pyridoxine.   Allergies: none SH: Born in [facility] came in [place], last travel 7/2024 until 11/2024 to [facility], has only lived in [place] in the , used to work for [employer] currently unemployed, Started smoking at age 20 and quit at age 30 5 cigarettes a day, lives with mom with a pet cat. Lives with multiple family members (brother, sister in law and their children, his wife new 9 month old child) and recently his son had a fever

## 2025-04-09 NOTE — HISTORY OF PRESENT ILLNESS
[FreeTextEntry1] : Patient is a 31 year old male with PMH of b/l Lung Transplant on 1/31/25 (CMV +/+, EBV +/+, Toxo -/-) on tacrolimus, mycophenolate and prednisone with prolonged hospital stay at [facility] 11/22/24 until 3/17/25 during the hospitalization patient was found to have bronchial cultures from 12/12/24 growing Hypoxylon, latent TB started (positive quantiferon though negative Tspot) on INH 12/19/24, COVID 19 3/4/25 (treated with 5 days of remdesivir 3/5 - 3/9/25), RSV infection 12/23/24 (treated with Ribavirin 12/23 - 12/28/25), and bronchial cultures from 2/1/25 growing Candida Parapsilosis (pansensitive), Penicillium, Paecilomyces and Pseduomonas Fluorences (pan sensitive), Fungitell 113 on 2/2 with follow ups negative and negative PJP PCR, positive galactomannan 1.15 with repeat on 3/4 negative. Patient here for follow up. He states that he developed a sore throat. He denies fever or chills and does admit to a little cough. He has been compliant with his posaconazole 200mg PO BID, valganciclovir 450mg PO BID and bactrim 400/80 PO QD without any missed doses. He also has been compliant with his INH/Pyridoxine.   Allergies: none SH: Born in [facility] came in [place], last travel 7/2024 until 11/2024 to [facility], has only lived in [place] in the , used to work for [employer] currently unemployed, Started smoking at age 20 and quit at age 30 5 cigarettes a day, lives with mom with a pet cat. Lives with multiple family members (brother, sister in law and their children, his wife new 9 month old child) and recently his son had a fever

## 2025-04-09 NOTE — PHYSICAL EXAM
[General Appearance - Alert] : alert [General Appearance - In No Acute Distress] : in no acute distress [Sclera] : the sclera and conjunctiva were normal [PERRL With Normal Accommodation] : pupils were equal in size, round, reactive to light [Outer Ear] : the ears and nose were normal in appearance [Examination Of The Oral Cavity] : the lips and gums were normal [Both Tympanic Membranes Were Examined] : both tympanic membranes were normal [Oropharynx] : the oropharynx was normal with no thrush [Neck Appearance] : the appearance of the neck was normal [Neck Cervical Mass (___cm)] : no neck mass was observed [Jugular Venous Distention Increased] : there was no jugular-venous distention [Respiration, Rhythm And Depth] : normal respiratory rhythm and effort [Exaggerated Use Of Accessory Muscles For Inspiration] : no accessory muscle use [Auscultation Breath Sounds / Voice Sounds] : lungs were clear to auscultation bilaterally [Heart Rate And Rhythm] : heart rate was normal and rhythm regular [Heart Sounds] : normal S1 and S2 [Heart Sounds Gallop] : no gallops [Murmurs] : no murmurs [Heart Sounds Pericardial Friction Rub] : no pericardial rub [Edema] : there was no peripheral edema [Bowel Sounds] : normal bowel sounds [Abdomen Soft] : soft [Abdomen Tenderness] : non-tender [Abdomen Mass (___ Cm)] : no abdominal mass palpated [Costovertebral Angle Tenderness] : no CVA tenderness [Cervical Lymph Nodes Enlarged Posterior Bilaterally] : posterior cervical [Cervical Lymph Nodes Enlarged Anterior Bilaterally] : anterior cervical [Supraclavicular Lymph Nodes Enlarged Bilaterally] : supraclavicular [Musculoskeletal - Swelling] : no joint swelling [Nail Clubbing] : no clubbing  or cyanosis of the fingernails [Skin Color & Pigmentation] : normal skin color and pigmentation [] : no rash [Oriented To Time, Place, And Person] : oriented to person, place, and time [Affect] : the affect was normal [FreeTextEntry1] : well healing b/l lung transplant scars

## 2025-04-11 NOTE — HISTORY OF PRESENT ILLNESS
[TextBox_4] : 31 year old male PVOD associated with right heart failure who presented with acute hypoxic respiratory failure and cardiogenic shock. Admitted to CCU and treated with Shankar and milrinone. Initiated on Epoprostenol  CT Chest showed centrilobular ground-glass nodules, interlobular septal thickening, and mediastinal lymphadenopathy. Course complicated by pneumothorax following placement of PAC. He underwent bronchoscopy and EBUS on 12/10/24. Lymph node negative for malignancy.   Following stabilization in the ICU patient was transferred to RCU 24. Tested positive for RSV 24 s/p course of Ribavirin. Evaluated by dental 24 for jaw pain and found to have inflamed gingiva around #17 distal and lingual, with generalized plaque accumulation and gross debris/plaque s/p irrigation and cleaning. No active infection noted. RRT on 24 in setting of difficulty with Flolan infusion running through PICC Line. Flolan was subsequently changed to R IJ but then became symptomatic with tachycardia, flushing and chest pain. Patient was transferred to MICU for further monitoring. Transferred back to RCU 24. Left anterior chest wall Pandya catheter placemed for Flolan infusion 24.  Listed:  Surgery: BILATERAL LUNG TRANSPLANT 25   Donor:  CMV + / EBV + / Toxo - , PHS risk Y/N Recipient: CMV +  / EBV +  / Toxo -  Induction: simluect 20mg x2,, medrol 1 g  Total Ischemic Time: RIGHT LUN" LEFT LUN" Extubated:25  Hospital Course: Bilateral lung transplant 25. Extubated on 25. Postoperative course notable for right main stem bronchus with necrotic material and persistent air leak requiring prolonged chest tube placement. Fluid cultures 25 grew penicillium and paecilomyces for which he is on posaconazole. Also developed an acute L IJ DVT treated with lose dose Eliquis. He also tested positive for COVID initially on 3/4/25 treated with a 5 day course of Remdesivir. He remained asymptomatic. Patient discharged home 3/17/25.  TELE-Visit 3/19/2025: Initial visit is remote given positive COVID. He remains asymptomatic. Feels somewhat tired after climbing stairs at home. Also notes some chest tightness around the chest, which was present during his hospital stay. He is taking his medications as instructed using his pill box, which he had on him during the visit. He is taking strict aspiration precautions, which is also being enforced by his family. He is staying with his brother and designated caretaker Jayson, who is also present for the visit. He is ambulating daily without issue. Patient has vitals available for review, which were checked at home. Reported SpO2 97 - 99% on room air. /75. T 97.4 F. Wt 129 lbs.  CLINIC 3/26/2025: Patient presents to clinic for routine follow up appointment post hospital discharge.  Recent COVID infection, last RVP negative, SPI improved, best to date, denies respiratory complications. Complained of sore throat/esophageal pain X 3 days. Thyroid assessed, appears normal, thyroid panel and DSA/Allosure drawn. Chest tube sutures removed, incisions healing well, no signs of infection noted. Medications reviewed with patient and brother, patient and brother verbalized understanding.   CLINIC 25: Slight decline in spirometry, not concerning as home sandy trend is stable, DSA/Allosure repeated for further surveillance. Patient complained of sore throat X1-2 days, denies sick contact, RVP swab sent. Patient also complained of numbness and tingling in hands and fingers, will reassess next week and consider adding gabapentin.  IS/Oi medications are stable; no changes this week. Routine labs to be repeated on Monday  CLINIC 25: Decline in spirometry, denies respiratory complications. RVP 4/3 + enterovirus and COVID. Started on a prednisone taper starting at 40 mg daily. Has further decline in spirometry from 2.63 last week to 2.49 today. Allosure was checked 3/26/25 and was 0.06%. DSA 0%. Has mild sore throat. No fevers. No shortness of breath. Patient's brother accompanied him today. Patient reports compliance with medications. His brother states that he is trying to encourage more physical activity.    DONOR CULTURES:  CHECK UNET POD 1,3,7,10,14 2025: respiratory cx: pseudomonas fluoresecens 25 blood cx: ngtd 25 R BAL: <10k staph aureus 25 L BAL: <10k staph aureus 25 urine cx: ngtd 25: bld cx: staph epi?? - I'm not seeing this on unet?  CULTURES: : OR: rare yeast, rare mold, + pseudomonas flurosecens : OR: candida parapsilosis, s/p  caspofungin : OR: rare penicillum  25 body fluid: rare candida, rare pseudomonas fluorescens 25 body fluid cx: rare candida parapsilosis, rare penicillium, rare paecilomyces 25: BAL: <10k growth 25 BAL: ngtd 25: NGTD 3/4/25: BAL + COVID (remdesivir 3/5 - 3/10) 3/7/25: RVP + COVID 3/13: RVP + COVID   TBBX: 1 MONTH: 3/4/25: A0Bx, C4D neg 3 MONTH: 4/15/2025 scheduled 8am 6 MONTH  9 MONTH  12 MONTH   DSA: 1 WEEK: (high risk): 25: 0%, NO DSA 2 WEEK: (high risk): 25: cPRA 0% NO DSA >2000 Late 1 month SENT 3/4/25; no Dsa, CPRA0% Repeated 3/26: 0% 3 MONTH:  6 MONTH  9 MONTH  12 MONTH  ALLOSURE: Late 1 month: SENT 3/4/25: 0.19% Repeat 3/26: 0.06% Repeated 2025 3 MONTH:  6 MONTH  9 MONTH  12 MONTH   SPIROMETRY  inpatient	 FVC 2.1	   FEV1 1.82 2025 inpatient	 FVC 2.21   FEV1 2.08 2025 inpatient	 FVC 1.54   FEV1 1.16 2025 inpatient	 FVC 2.07    FEV1 2.05 3/3/2025	inpatient	FVC 1.94	   FEV1 1.77 3/10/2025 inpatient	 FVC 2.04    FEV1 1.85 3/13/2025 inpatient	 FVC 2.21    FEV1 2.06 3/17/2025 inpatient	 FVC 2.06    FEV1 1.94 3/26/2025 [facility] FVC 2.98   FEV1 2.92 2025 [facility]   FVC 2.70   FEV1 2.63 2025 [facility]:  FVC 2.61   FEV1 2.49

## 2025-04-11 NOTE — END OF VISIT
[Time Spent: ___ minutes] : I have spent [unfilled] minutes of time on the encounter which excludes teaching and separately reported services. [FreeTextEntry3] : 30 year old male presented initially with cardiogenic shock and acute on chronic RH failure in setting of severe Group 1 PAH / PVOD 11/22/2024. Listed for bilateral lung transplant on 12/20/24. Underwent bilateral LT 1/31/2025. Discharged from index admission 3/17/25. Clinically doing well. Saturating well on room air. Recently tested positive on RVP 4/3/25 for rhinovirus and SARS-CoV2. Started on prednisone taper and completed empiric course of levofloxacin.  Lung Transplant Recipient / Immunosuppression Tacrolimus alternating 0.5 mg QD and 0.5 mg BID (Target 10 - 12) Cellcept 750 mg daily. Prednisone taper with plan to resume 17.5 mg daily once completed. TBBX 3/4/25 A0Bx, negative C4d DSA 3/26/25 - 0% Allosure 3/26/25 - 0.06% Spirometry today reviewed. FEV1 dropped from 2.63 on 4/2/25 to 2.49. Planned for bronchoscopy with TBBX next week 4/15/25 OI PPX: Bactrim, Valcyte (CMV negative 3/17/25), Posaconazole. Repeat monthly levels (4/7/25). COVID/Enterovirus: Prednisone taper. Completed empiric course of levofloxacin. IgG level 4/2/25 956. LTBI: INH/B6. Transplant ID follow up L IJ Clot: Last UE/LE Duplex negative 3/12/25. Eliquis 2.5 mg BID. Stop after 3 months (5/12/25) GERD: Aspiration precautions.

## 2025-04-11 NOTE — PHYSICAL EXAM
[No Acute Distress] : no acute distress [Normal Oropharynx] : normal oropharynx [Normal Appearance] : normal appearance [No Neck Mass] : no neck mass [Normal Rate/Rhythm] : normal rate/rhythm [Normal S1, S2] : normal s1, s2 [No Murmurs] : no murmurs [No Resp Distress] : no resp distress [Clear to Auscultation Bilaterally] : clear to auscultation bilaterally [No Abnormalities] : no abnormalities [Benign] : benign [No Clubbing] : no clubbing [No Cyanosis] : no cyanosis [No Edema] : no edema [Normal Color/ Pigmentation] : normal color/ pigmentation [No Focal Deficits] : no focal deficits [Oriented x3] : oriented x3 [Normal Gait] : normal gait [TextBox_140] : Flat affect

## 2025-04-11 NOTE — ASSESSMENT
[FreeTextEntry1] : 30yo male newly dx with PAH & RV failure, former smoker. S/P Bilateral Lung Transplant 1/31/2025   #IS GOAL 10-14  Tacrolimus 0.5 mg BID (Level 4/7/25: 17.3) decreased to 0.5mg daily and 0.5mg BID alternating doses - Prednisone 17.5mg for wound healing - Cellcept 750 mg BID  #OI - Valcyte 450mg BID - Bactrim SS daily -Posaconazole 200 mg BID  #LUNG TXP - Culture 2/1/25 rare penicillium - Posa 200mg BID - Transplant ID appointment pending   #GI Hx of GERD - Protonix 40 mg daily  #HEM Left IJ DVT Plan: discontinue after 3 months (5/13/25) - Eliquis 2.5 mg BID, started 2/13/25   #TB Latent TB therapy - Isoniazid 300 mg daily - Pyridoxine 50 mg daily     FOLLOW UP - Routine weekly labs 4/14/2025 - [place] reviewed, declined - DSA/Allosure for 4/16/25 - 3-Month bronch 4/15/25 - Vascular appointment pending - UE duplex to be repeated 5/13/25 - Continue to follow up with Transplant ID.       RTC in 1 week with Mendez & CXR  All questions answered, used teach back method, patient verbalized understanding.  Above discussed with Dr. Brito

## 2025-04-11 NOTE — ASSESSMENT
[FreeTextEntry1] : 30yo male newly dx with PAH & RV failure, former smoker. S/P Bilateral Lung Transplant 1/31/2025   #IS GOAL 10-14  Tacrolimus 0.5 mg BID (Level 4/7/25: 17.3) decreased to 0.5mg daily and 0.5mg BID alternating doses - Prednisone 17.5mg for wound healing - Cellcept 750 mg BID  #OI - Valcyte 450mg BID - Bactrim SS daily -Posaconazole 200 mg BID  #LUNG TXP - Culture 2/1/25 rare penicillium - Posa 200mg BID - Transplant ID appointment pending   #GI Hx of GERD - Protonix 40 mg daily  #HEM Left IJ DVT Plan: discontinue after 3 months (5/13/25) - Eliquis 2.5 mg BID, started 2/13/25   #TB Latent TB therapy - Isoniazid 300 mg daily - Pyridoxine 50 mg daily     FOLLOW UP - Routine weekly labs 4/14/2025 - [place] reviewed, declined - DSA/Allosure for 4/16/25 - 3-Month bronch 4/15/25 - Vascular appointment pending - UE duplex to be repeated 5/13/25 - Continue to follow up with Transplant ID.       RTC in 1 week with Mendez & CXR  All questions answered, used teach back method, patient verbalized understanding.  Above discussed with Dr. Brito

## 2025-04-11 NOTE — ASSESSMENT
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 S/P Bilateral Lung Transplant 1/31/2025   #IS GOAL 10-14  Tacrolimus 0.5 mg BID (Level 4/7/25: 17.3) decreased to 0.5mg daily and 0.5mg BID alternating doses - Prednisone 17.5mg for wound healing - Cellcept 750 mg BID  #OI - Valcyte 450mg BID - Bactrim SS daily -Posaconazole 200 mg BID  #LUNG TXP - Culture 2/1/25 rare penicillium - Posa 200mg BID - Transplant ID appointment pending   #GI Hx of GERD - Protonix 40 mg daily  #HEM Left IJ DVT Plan: discontinue after 3 months (5/13/25) - Eliquis 2.5 mg BID, started 2/13/25   #TB Latent TB therapy - Isoniazid 300 mg daily - Pyridoxine 50 mg daily     FOLLOW UP - Routine weekly labs 4/14/2025 - [place] reviewed, declined - DSA/Allosure for 4/16/25 - 3-Month bronch 4/15/25 - Vascular appointment pending - UE duplex to be repeated 5/13/25 - Continue to follow up with Transplant ID.       RTC in 1 week with Mendez & CXR  All questions answered, used teach back method, patient verbalized understanding.  Above discussed with Dr. Brito

## 2025-04-17 NOTE — HISTORY OF PRESENT ILLNESS
[TextBox_4] : 31 year old male PVOD associated with right heart failure who presented with acute hypoxic respiratory failure and cardiogenic shock. Admitted to CCU and treated with Shankar and milrinone. Initiated on Epoprostenol  CT Chest showed centrilobular ground-glass nodules, interlobular septal thickening, and mediastinal lymphadenopathy. Course complicated by pneumothorax following placement of PAC. He underwent bronchoscopy and EBUS on 12/10/24. Lymph node negative for malignancy.   Following stabilization in the ICU patient was transferred to RCU 24. Tested positive for RSV 24 s/p course of Ribavirin. Evaluated by dental 24 for jaw pain and found to have inflamed gingiva around #17 distal and lingual, with generalized plaque accumulation and gross debris/plaque s/p irrigation and cleaning. No active infection noted. RRT on 24 in setting of difficulty with Flolan infusion running through PICC Line. Flolan was subsequently changed to R IJ but then became symptomatic with tachycardia, flushing and chest pain. Patient was transferred to MICU for further monitoring. Transferred back to RCU 24. Left anterior chest wall Pandya catheter placemed for Flolan infusion 24.  Listed:  Surgery: BILATERAL LUNG TRANSPLANT 25   Donor:  CMV + / EBV + / Toxo - , PHS risk Y/N Recipient: CMV +  / EBV +  / Toxo -  Induction: simluect 20mg x2,, medrol 1 g  Total Ischemic Time: RIGHT LUN" LEFT LUN" Extubated:25  Hospital Course: Bilateral lung transplant 25. Extubated on 25. Postoperative course notable for right main stem bronchus with necrotic material and persistent air leak requiring prolonged chest tube placement. Fluid cultures 25 grew penicillium and paecilomyces for which he is on posaconazole. Also developed an acute L IJ DVT treated with lose dose Eliquis. He also tested positive for COVID initially on 3/4/25 treated with a 5 day course of Remdesivir. He remained asymptomatic. Patient discharged home 3/17/25.  TELE-Visit 3/19/2025: Initial visit is remote given positive COVID. He remains asymptomatic. Feels somewhat tired after climbing stairs at home. Also notes some chest tightness around the chest, which was present during his hospital stay. He is taking his medications as instructed using his pill box, which he had on him during the visit. He is taking strict aspiration precautions, which is also being enforced by his family. He is staying with his brother and designated caretaker Jayson, who is also present for the visit. He is ambulating daily without issue. Patient has vitals available for review, which were checked at home. Reported SpO2 97 - 99% on room air. /75. T 97.4 F. Wt 129 lbs.  CLINIC 3/26/2025: Patient presents to clinic for routine follow up appointment post hospital discharge.  Recent COVID infection, last RVP negative, SPI improved, best to date, denies respiratory complications. Complained of sore throat/esophageal pain X 3 days. Thyroid assessed, appears normal, thyroid panel and DSA/Allosure drawn. Chest tube sutures removed, incisions healing well, no signs of infection noted. Medications reviewed with patient and brother, patient and brother verbalized understanding.   CLINIC 25: Slight decline in spirometry, not concerning as home sandy trend is stable, DSA/Allosure repeated for further surveillance. Patient complained of sore thrat X1-2 days, denies sick contact, RVP swab sent. Patient also complained of numbness and tingling in hands and fingers, will reassess next week and consider adding gabapentin.  IS/Oi medications are stable; no changes this week. Routine labs to be repeated on Monday  CLINIC 25: RVP 4/3 + enterovirus and COVID. Started on a prednisone taper starting at 40 mg daily. Has further decline in spirometry from 2.63 last week to 2.49 today. Allosure was checked 3/26/25 and was 0.06%. DSA 0%. Has mild sore throat. No fevers. No shortness of breath. Patient's brother accompanied him today. Patient reports compliance with medications. His brother states that he is trying to encourage more physical activity.  Readmitted 4/10 - 25 for concern for respiratory symptoms, COVID infection with worsening PFTs. CT chest negative, + enterovirus on RVP. Covid was negative. Transplant ID consulted. Completed empiric course of levaquin inpatient.   CLINIC 2025: Mr. Buck was seen and examined by me and the ACP on [facility] [address] in a transplant office and I reviewed his data which was extensive the day before the scheduled visitation completely including lab data and radiographic information.  SPI stable, denies respiratory complications, offers no complaints. No changes to IS/ OI medications.   DONOR CULTURES:  CHECK UNET POD 1,3,7,10,14 2025: respiratory cx: pseudomonas fluoresecens 25 blood cx: ngtd 25 R BAL: <10k staph aureus 25 L BAL: <10k staph aureus 25 urine cx: ngtd 25: bld cx: staph epi?? - I'm not seeing this on unet?  CULTURES: : OR: rare yeast, rare mold, + pseudomonas flurosecens : OR: candida parapsilosis, s/p  caspofungin : OR: rare penicillum  25 body fluid: rare candida, rare pseudomonas fluorescens 25 body fluid cx: rare candida parapsilosis, rare penicillium, rare paecilomyces 25: BAL: <10k growth 25 BAL: ngtd 25: NGTD 3/4/25: BAL + COVID (remdesivir 3/5 - 3/10) 3/7/25: RVP + COVID 3/13: RVP + COVID   TBBX: 1 MONTH: 3/4/25: A0Bx, C4D neg 3 MONTH: 4/15/2025 scheduled 8am 6 MONTH  9 MONTH  12 MONTH   DSA: 1 WEEK: (high risk): 25: 0%, NO DSA 2 WEEK: (high risk): 25: cPRA 0% NO DSA >2000 Late 1 month SENT 3/4/25; no Dsa, CPRA0% Repeated 3/26: 0% 3 MONTH: 2025: pending 6 MONTH  9 MONTH  12 MONTH  ALLOSURE: Late 1 month: SENT 3/4/25: 0.19% Repeat 3/26: 0.06% Repeated 2025 3 MONTH:  2025: pending 6 MONTH  9 MONTH  12 MONTH   SPIROMETRY  inpatient	 FVC 2.1	    FEV1 1.82 2025 inpatient	 FVC 2.21    FEV1 2.08 2025 inpatient	 FVC 1.54    FEV1 1.16 2025 inpatient	 FVC 2.07    FEV1 2.05 3/3/2025	inpatient	 FVC 1.94    FEV1 1.77 3/10/2025 inpatient	 FVC 2.04    FEV1 1.85 3/13/2025 inpatient	 FVC 2.21    FEV1 2.06 3/17/2025 inpatient	 FVC 2.06    FEV1 1.94 3/26/2025 [facility]: FVC 2.98   FEV1 2.92 2025 [facility]:  FVC 2.70   FEV1 2.63 2025 [facility]:  FVC 2.61   FEV1 2.49 2025 [facility]:  FVC 2.53   FEV1 2.40

## 2025-04-17 NOTE — HISTORY OF PRESENT ILLNESS
[TextBox_4] : 31 year old male PVOD associated with right heart failure who presented with acute hypoxic respiratory failure and cardiogenic shock. Admitted to CCU and treated with Shankar and milrinone. Initiated on Epoprostenol  CT Chest showed centrilobular ground-glass nodules, interlobular septal thickening, and mediastinal lymphadenopathy. Course complicated by pneumothorax following placement of PAC. He underwent bronchoscopy and EBUS on 12/10/24. Lymph node negative for malignancy.   Following stabilization in the ICU patient was transferred to RCU 24. Tested positive for RSV 24 s/p course of Ribavirin. Evaluated by dental 24 for jaw pain and found to have inflamed gingiva around #17 distal and lingual, with generalized plaque accumulation and gross debris/plaque s/p irrigation and cleaning. No active infection noted. RRT on 24 in setting of difficulty with Flolan infusion running through PICC Line. Flolan was subsequently changed to R IJ but then became symptomatic with tachycardia, flushing and chest pain. Patient was transferred to MICU for further monitoring. Transferred back to RCU 24. Left anterior chest wall Pandya catheter placemed for Flolan infusion 24.  Listed:  Surgery: BILATERAL LUNG TRANSPLANT 25   Donor:  CMV + / EBV + / Toxo - , PHS risk Y/N Recipient: CMV +  / EBV +  / Toxo -  Induction: simluect 20mg x2,, medrol 1 g  Total Ischemic Time: RIGHT LUN" LEFT LUN" Extubated:25  Hospital Course: Bilateral lung transplant 25. Extubated on 25. Postoperative course notable for right main stem bronchus with necrotic material and persistent air leak requiring prolonged chest tube placement. Fluid cultures 25 grew penicillium and paecilomyces for which he is on posaconazole. Also developed an acute L IJ DVT treated with lose dose Eliquis. He also tested positive for COVID initially on 3/4/25 treated with a 5 day course of Remdesivir. He remained asymptomatic. Patient discharged home 3/17/25.  TELE-Visit 3/19/2025: Initial visit is remote given positive COVID. He remains asymptomatic. Feels somewhat tired after climbing stairs at home. Also notes some chest tightness around the chest, which was present during his hospital stay. He is taking his medications as instructed using his pill box, which he had on him during the visit. He is taking strict aspiration precautions, which is also being enforced by his family. He is staying with his brother and designated caretaker Jayson, who is also present for the visit. He is ambulating daily without issue. Patient has vitals available for review, which were checked at home. Reported SpO2 97 - 99% on room air. /75. T 97.4 F. Wt 129 lbs.  CLINIC 3/26/2025: Patient presents to clinic for routine follow up appointment post hospital discharge.  Recent COVID infection, last RVP negative, SPI improved, best to date, denies respiratory complications. Complained of sore throat/esophageal pain X 3 days. Thyroid assessed, appears normal, thyroid panel and DSA/Allosure drawn. Chest tube sutures removed, incisions healing well, no signs of infection noted. Medications reviewed with patient and brother, patient and brother verbalized understanding.   CLINIC 25: Slight decline in spirometry, not concerning as home sandy trend is stable, DSA/Allosure repeated for further surveillance. Patient complained of sore thrat X1-2 days, denies sick contact, RVP swab sent. Patient also complained of numbness and tingling in hands and fingers, will reassess next week and consider adding gabapentin.  IS/Oi medications are stable; no changes this week. Routine labs to be repeated on Monday  CLINIC 25: RVP 4/3 + enterovirus and COVID. Started on a prednisone taper starting at 40 mg daily. Has further decline in spirometry from 2.63 last week to 2.49 today. Allosure was checked 3/26/25 and was 0.06%. DSA 0%. Has mild sore throat. No fevers. No shortness of breath. Patient's brother accompanied him today. Patient reports compliance with medications. His brother states that he is trying to encourage more physical activity.  Readmitted 4/10 - 25 for concern for respiratory symptoms, COVID infection with worsening PFTs. CT chest negative, + enterovirus on RVP. Covid was negative. Transplant ID consulted. Completed empiric course of levaquin inpatient.   CLINIC 2025: Mr. Buck was seen and examined by me and the ACP on 2025 at [facility] [address] in a transplant office and I reviewed his data which was extensive the day before the scheduled visitation completely including lab data and radiographic information.  SPI stable, denies respiratory complications, offers no complaints. No changes to IS/ OI medications.   DONOR CULTURES:  CHECK UNET POD 1,3,7,10,14 2025: respiratory cx: pseudomonas fluoresecens 25 blood cx: ngtd 25 R BAL: <10k staph aureus 25 L BAL: <10k staph aureus 25 urine cx: ngtd 25: bld cx: staph epi?? - I'm not seeing this on unet?  CULTURES: : OR: rare yeast, rare mold, + pseudomonas flurosecens : OR: candida parapsilosis, s/p  caspofungin : OR: rare penicillum  25 body fluid: rare candida, rare pseudomonas fluorescens 25 body fluid cx: rare candida parapsilosis, rare penicillium, rare paecilomyces 25: BAL: <10k growth 25 BAL: ngtd 25: NGTD 3/4/25: BAL + COVID (remdesivir 3/5 - 3/10) 3/7/25: RVP + COVID 3/13: RVP + COVID   TBBX: 1 MONTH: 3/4/25: A0Bx, C4D neg 3 MONTH: 4/15/2025 scheduled 8am 6 MONTH  9 MONTH  12 MONTH   DSA: 1 WEEK: (high risk): 25: 0%, NO DSA 2 WEEK: (high risk): 25: cPRA 0% NO DSA >2000 Late 1 month SENT 3/4/25; no Dsa, CPRA0% Repeated 3/26: 0% 3 MONTH: 2025: pending 6 MONTH  9 MONTH  12 MONTH  ALLOSURE: Late 1 month: SENT 3/4/25: 0.19% Repeat 3/26: 0.06% Repeated 2025 3 MONTH:  2025: pending 6 MONTH  9 MONTH  12 MONTH   SPIROMETRY  inpatient	 FVC 2.1	    FEV1 1.82 2025 inpatient	 FVC 2.21    FEV1 2.08 2025 inpatient	 FVC 1.54    FEV1 1.16 2025 inpatient	 FVC 2.07    FEV1 2.05 3/3/2025	inpatient	 FVC 1.94    FEV1 1.77 3/10/2025 inpatient	 FVC 2.04    FEV1 1.85 3/13/2025 inpatient	 FVC 2.21    FEV1 2.06 3/17/2025 inpatient	 FVC 2.06    FEV1 1.94 3/26/2025 [facility]: FVC 2.98   FEV1 2.92 2025 [facility]:  FVC 2.70   FEV1 2.63 2025 [facility]:  FVC 2.61   FEV1 2.49 2025 [facility]:  FVC 2.53   FEV1 2.40

## 2025-04-17 NOTE — END OF VISIT
[FreeTextEntry3] : I saw and examined the patient and reviewed his chart and data and his extensive medical history from the day of transplant before and I have been intimately involved in his care and support the plan as mentioned by the ACP.  I attest to the truthfulness of this report I have also consulted with the patient regarding details of postoperative transplant care.

## 2025-04-17 NOTE — ASSESSMENT
[FreeTextEntry1] : 30yo male newly dx with PAH & RV failure, former smoker. S/P Bilateral Lung Transplant 1/31/2025   #Lung TXP - low PFT, + COVID - Surveillance bronch 4/48/2025      #IS GOAL 10-12  Tacrolimus 0.5 mg BID, 0.5 mg QD alternating (Level 4/14/25: 11.3) - Prednisone 17.5mg  - Cellcept 750 mg daily, decreased to 250mg d/t COVID  #OI - Valcyte 450mg BID - Bactrim SS daily - Posaconazole 200 mg BID  #LUNG TXP - Culture 2/1/25 rare penicillium - Posa 200mg BID - Transplant ID appointment pending   #GI Hx of GERD - Protonix 40 mg daily  #HEM Left IJ DVT Plan: discontinue after 3 months (5/13/25) - Eliquis 2.5 mg BID, started 2/13/25   #TB Latent TB therapy - Isoniazid 300 mg daily - Pyridoxine 50 mg daily     FOLLOW UP - Routine weekly labs 4/21/2025 - [place] reviewed, stable - DSA/Allosure drawn today - 3-Month bronch 4/15/25 - Vascular appointment pending - UE duplex to be repeated 5/13/25 - Continue to follow up with Transplant ID.    RTC in 1 week with [place] & CXR  All questions answered, used teach back method, patient verbalized understanding.  Above discussed with Dr. Solorio

## 2025-04-17 NOTE — PHYSICAL EXAM
[No Acute Distress] : no acute distress [Normal Oropharynx] : normal oropharynx [No Neck Mass] : no neck mass [Normal S1, S2] : normal s1, s2 [No Resp Distress] : no resp distress [No Abnormalities] : no abnormalities [Benign] : benign [Normal Gait] : normal gait [No Edema] : no edema [Normal Color/ Pigmentation] : normal color/ pigmentation [No Focal Deficits] : no focal deficits [Oriented x3] : oriented x3 [Normal Affect] : normal affect

## 2025-04-17 NOTE — ASSESSMENT
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 S/P Bilateral Lung Transplant 1/31/2025   #Lung TXP - low PFT, + COVID - Surveillance bronch 4/48/2025      #IS GOAL 10-12  Tacrolimus 0.5 mg BID, 0.5 mg QD alternating (Level 4/14/25: 11.3) - Prednisone 17.5mg  - Cellcept 750 mg daily, decreased to 250mg d/t COVID  #OI - Valcyte 450mg BID - Bactrim SS daily - Posaconazole 200 mg BID  #LUNG TXP - Culture 2/1/25 rare penicillium - Posa 200mg BID - Transplant ID appointment pending   #GI Hx of GERD - Protonix 40 mg daily  #HEM Left IJ DVT Plan: discontinue after 3 months (5/13/25) - Eliquis 2.5 mg BID, started 2/13/25   #TB Latent TB therapy - Isoniazid 300 mg daily - Pyridoxine 50 mg daily     FOLLOW UP - Routine weekly labs 4/21/2025 - [place] reviewed, stable - DSA/Allosure drawn today - 3-Month bronch 4/15/25 - Vascular appointment pending - UE duplex to be repeated 5/13/25 - Continue to follow up with Transplant ID.    RTC in 1 week with [place] & CXR  All questions answered, used teach back method, patient verbalized understanding.  Above discussed with Dr. Solorio

## 2025-04-18 NOTE — HISTORY OF PRESENT ILLNESS
[FreeTextEntry1] : Patient is a 31 year old male with PMH of b/l Lung Transplant on 1/31/25 (CMV +/+, EBV +/+, Toxo -/-) on tacrolimus, mycophenolate and prednisone with prolonged hospital stay at [facility] 11/22/24 until 3/17/25 during the hospitalization patient was found to have bronchial cultures from 12/12/24 growing Hypoxylon, latent TB started (positive quantiferon though negative Tspot) on INH 12/19/24, COVID 19 3/4/25 (treated with 5 days of remdesivir 3/5 - 3/9/25), RSV infection 12/23/24 (treated with Ribavirin 12/23 - 12/28/25), and bronchial cultures from 2/1/25 growing Candida Parapsilosis (pansensitive), Penicillium, Paecilomyces and Pseduomonas Fluorences (pan sensitive), Fungitell 113 on 2/2 with follow ups negative and negative PJP PCR, positive galactomannan 1.15 with repeat on 3/4 negative. Since being seen last visit patient was admitted to [facility] from 4/10 - 4/11/24 with RVP positive for Rhinovirus and had posaconazole dose decreased to 300mg PO QD Patient here for follow up. During last visit patient was found to have A1c of 6.8. He states he feels better than he was previously but isn't 100% yet. He states that he occasionally gets a poking like pain on his sides that will start and stop and can also be on either side. He states he feels cramp like. He denies fevers chills, sob, cough, abdominal pain, or urinary complaints.   Allergies: none SH: Born in [facility] came in 2012, last travel 7/2024 until 11/2024 to [facility], has only lived in [place] in the , used to work for [employer] currently unemployed, Started smoking at age 20 and quit at age 30 5 cigarettes a day, lives with mom with a pet cat. Lives with multiple family members (brother, sister in law and their children, his wife new 9 month old child) and recently his son had a fever

## 2025-04-18 NOTE — REVIEW OF SYSTEMS
[Chest Pain] : chest pain [Negative] : Heme/Lymph [Palpitations] : no palpitations [Leg Claudication] : no intermittent leg claudication [Lower Ext Edema] : no extremity edema

## 2025-04-18 NOTE — HISTORY OF PRESENT ILLNESS
[FreeTextEntry1] : Patient is a 31 year old male with PMH of b/l Lung Transplant on 1/31/25 (CMV +/+, EBV +/+, Toxo -/-) on tacrolimus, mycophenolate and prednisone with prolonged hospital stay at [facility] 11/22/24 until 3/17/25 during the hospitalization patient was found to have bronchial cultures from 12/12/24 growing Hypoxylon, latent TB started (positive quantiferon though negative Tspot) on INH 12/19/24, COVID 19 3/4/25 (treated with 5 days of remdesivir 3/5 - 3/9/25), RSV infection 12/23/24 (treated with Ribavirin 12/23 - 12/28/25), and bronchial cultures from 2/1/25 growing Candida Parapsilosis (pansensitive), Penicillium, Paecilomyces and Pseduomonas Fluorences (pan sensitive), Fungitell 113 on 2/2 with follow ups negative and negative PJP PCR, positive galactomannan 1.15 with repeat on 3/4 negative. Since being seen last visit patient was admitted to [facility] from 4/10 - 4/11/24 with RVP positive for Rhinovirus and had posaconazole dose decreased to 300mg PO QD Patient here for follow up. During last visit patient was found to have A1c of 6.8. He states he feels better than he was previously but isn't 100% yet. He states that he occasionally gets a poking like pain on his sides that will start and stop and can also be on either side. He states he feels cramp like. He denies fevers chills, sob, cough, abdominal pain, or urinary complaints.   Allergies: none SH: Born in [facility] came in [place], last travel 7/2024 until 11/2024 to [facility], has only lived in [place] in the , used to work for [employer] currently unemployed, Started smoking at age 20 and quit at age 30 5 cigarettes a day, lives with mom with a pet cat. Lives with multiple family members (brother, sister in law and their children, his wife new 9 month old child) and recently his son had a fever

## 2025-04-18 NOTE — ASSESSMENT
[FreeTextEntry1] : Patient is a 31 year old male with PMH of b/l Lung Transplant on 25 (CMV +/+, EBV +/+, Toxo -/-) on tacrolimus, mycophenolate and prednisone with prolonged hospital stay at [facility] 24 until 3/17/25 during the hospitalization patient was found to have bronchial cultures from 24 growing Hypoxylon, latent TB started (positive quantiferon though negative Tspot) on INH 24, COVID 19 3/4/25 (treated with 5 days of remdesivir 3/5 - 3/9/25), RSV infection 24 (treated with Ribavirin  - 25), and bronchial cultures from 25 growing Candida Parapsilosis (pansensitive), Penicillium, Paecilomyces and Pseduomonas Fluorences (pan sensitive), Fungitell 113 on  with follow ups negative and negative Pneumocystis PCR, positive galactomannan 1.15 with repeat on 3/4 negative.  Hospital admissions 24 -3/17/25: initial lung transplant admission. found to have bronchial cultures from 24 growing Hypoxylon, latent TB started (positive quantiferon though negative Tspot) on INH 24, COVID 19 3/4/25 (treated with 5 days of remdesivir 3/5 - 3/9/25), RSV infection 24 (treated with Ribavirin  - 25), LIJ DVT on US from 25 and bronchial cultures from 25 growing Candida Parapsilosis (pansensitive), Penicillium, Paecilomyces and Pseduomonas Fluorences (pan sensitive), Fungitell 113 on  with follow ups negative and negative Pneumocystis PCR, positive galactomannan 1.15 with repeat on 3/4 negative  Medications: caspofungin  - 25 isavuconazonium  - 25 zosyn  - 24,  - 25, 2/10 - 25 Posaconazole 24 - ,  - ,  - 3/17 200mg BID (posa level  0.8 and 3/3 1.7) vancomycin IV  - 2/10/25 doxycycline  - 25 Vanganciclovir  - 3/17/25 TMP/SMX PPx:  - 3/17/25  Important imagin24: Abdominal US negative 1/15/25 Renal Bladder US: negative 25: US carotids : LIJ DVT 25: CT CAP: R sided ground glass, RLL nodular opacity and no abdominal pelvis pathology 3/17/25: CXR clear lungs  New Hospitalization 4/10/25 - 25: [facility], Rhinovirus positive and COVID19 positive not given remdesivir as likely viral shedding, posaconazole dose decreased to 300mg PO QD  Assessments and Plan: *B/l Lung Transplant on 25 (CMV +/+, EBV +/+, Toxo -/-) on tacrolimus, mycophenolate and prednisone, patient extubated 25 - continue Valganciclovir 450mg PO BID - continue TMP/SMX 80/400 PO QD for PJP PPx - immunosuppression per primary transplant team - patient optimized from ID standpoint and can practice safe sexual intercourse with wife, counseled on safe sex practices  - patient optimized from ID standpoint and is allowed to drive/carry baby, defer decision to primary transplant team   *Latent TB (started on therapy 24), positive quantiferon 12/3/24, negative T spot - continue INH with vitamin B6, will need treatment through 8/3/25 to complete 9 months of therapy *DM with A1c of 6.8, with prednisone which can increase glucose level, additionally patient immunosupressed with tacrolimus which can induce DM - start Metformin 500mg PO QD, low dose as A1c 6.8 and patient on lower doses of prednisone, will repeat A1c in 3 months to assure improvement  - patient counseled on proper diet and exercise   *Hx of COVID19 and enterovirus infection during hospital stay 4/10/25, Hx of COVID 19 3/4/25 (treated with 5 days of remdesivir 3/5 - 3/9/25), RSV infection 24 (treated with Ribavirin  - 25) - no further indication for treatment or testing at this time   *Hx of Fungal Lung cultures positive bronchial cultures from 25 growing Candida Parapsilosis (pansensitive), Penicillium, and Paecilomyces, Fungitell 113 on  with follow ups negative and negative PJP PCR, positive galactomannan 1.15 with repeat on 3/4 negative - continue Posaconazole 300mg PO QD - posaconazole level on 25 was 2.8  *AFB culture from 12/10/24 growing Hypoxylon, this is a tree/environmental mold which may not be clinically significant especially as it was only found on one culture, though patient currently on treatment posa - continue posaconazole - if patient undergoes bronchoscopy would obtain repeat AFB and fungal cultures  *Pseudomonas Fluorecens (pan sensitive) in cultures from 25 s/p zosyn (length of courses above) - no further treatment at this time  *LIJ DVT - anticoagulation per primary transplant team  *HCM - defer vaccination at this time as patient will unlikely to clinically to respond to them due to immunosuppressed state  Follow up: 2 months with Dr. Hunter Claire

## 2025-04-18 NOTE — PHYSICAL EXAM
[General Appearance - Alert] : alert [General Appearance - In No Acute Distress] : in no acute distress [Sclera] : the sclera and conjunctiva were normal [PERRL With Normal Accommodation] : pupils were equal in size, round, reactive to light [Outer Ear] : the ears and nose were normal in appearance [Examination Of The Oral Cavity] : the lips and gums were normal [Oropharynx] : the oropharynx was normal with no thrush [Neck Appearance] : the appearance of the neck was normal [Neck Cervical Mass (___cm)] : no neck mass was observed [Jugular Venous Distention Increased] : there was no jugular-venous distention [Respiration, Rhythm And Depth] : normal respiratory rhythm and effort [Exaggerated Use Of Accessory Muscles For Inspiration] : no accessory muscle use [Auscultation Breath Sounds / Voice Sounds] : lungs were clear to auscultation bilaterally [Heart Rate And Rhythm] : heart rate was normal and rhythm regular [Heart Sounds] : normal S1 and S2 [Heart Sounds Gallop] : no gallops [Murmurs] : no murmurs [Heart Sounds Pericardial Friction Rub] : no pericardial rub [Edema] : there was no peripheral edema [Bowel Sounds] : normal bowel sounds [Abdomen Soft] : soft [Abdomen Tenderness] : non-tender [Abdomen Mass (___ Cm)] : no abdominal mass palpated [Costovertebral Angle Tenderness] : no CVA tenderness [Cervical Lymph Nodes Enlarged Posterior Bilaterally] : posterior cervical [Cervical Lymph Nodes Enlarged Anterior Bilaterally] : anterior cervical [Supraclavicular Lymph Nodes Enlarged Bilaterally] : supraclavicular [Musculoskeletal - Swelling] : no joint swelling [Nail Clubbing] : no clubbing  or cyanosis of the fingernails [Skin Color & Pigmentation] : normal skin color and pigmentation [] : no rash [Oriented To Time, Place, And Person] : oriented to person, place, and time [Affect] : the affect was normal [FreeTextEntry1] : well healing b/l lung transplant scars no pain on palpation

## 2025-04-18 NOTE — END OF VISIT
[] : Fellow [FreeTextEntry3] : Patient seen and examined  Case discussed with DR Claire I agree with his interval history exam and plans as noted above Patient s/p bilateral lung transplant. On treatment foir multiple low pathogenic fungal strains, latent TB therapy expected to complete in 9/25., recurrent viral infecitons COVID/RSV. Today he is Feeling generally well, breathing room air comfortably. Completed therapy for recent psuedomonas flourescens bronchitis/pneumonia RTC in 2-3mo

## 2025-04-18 NOTE — ASSESSMENT
[FreeTextEntry1] : Patient is a 31 year old male with PMH of b/l Lung Transplant on 25 (CMV +/+, EBV +/+, Toxo -/-) on tacrolimus, mycophenolate and prednisone with prolonged hospital stay at [facility] 24 until 3/17/25 during the hospitalization patient was found to have bronchial cultures from 24 growing Hypoxylon, latent TB started (positive quantiferon though negative Tspot) on INH 24, COVID 19 3/4/25 (treated with 5 days of remdesivir 3/5 - 3/9/25), RSV infection 24 (treated with Ribavirin  - 25), and bronchial cultures from 25 growing Candida Parapsilosis (pansensitive), Penicillium, Paecilomyces and Pseduomonas Fluorences (pan sensitive), Fungitell 113 on  with follow ups negative and negative Pneumocystis PCR, positive galactomannan 1.15 with repeat on 3/4 negative.  Hospital admissions 24 -3/17/25: initial lung transplant admission. found to have bronchial cultures from 24 growing Hypoxylon, latent TB started (positive quantiferon though negative Tspot) on INH 24, COVID 19 3/4/25 (treated with 5 days of remdesivir 3/5 - 3/9/25), RSV infection 24 (treated with Ribavirin  - 25), LIJ DVT on US from 25 and bronchial cultures from 25 growing Candida Parapsilosis (pansensitive), Penicillium, Paecilomyces and Pseduomonas Fluorences (pan sensitive), Fungitell 113 on  with follow ups negative and negative Pneumocystis PCR, positive galactomannan 1.15 with repeat on 3/4 negative  Medications: caspofungin  - 25 isavuconazonium  - 25 zosyn  - 24,  - 25, 2/10 - 25 Posaconazole 24 - ,  - ,  - 3/17 200mg BID (posa level  0.8 and 3/3 1.7) vancomycin IV  - 2/10/25 doxycycline  - 25 Vanganciclovir  - 3/17/25 TMP/SMX PPx:  - 3/17/25  Important imagin24: Abdominal US negative 1/15/25 Renal Bladder US: negative 25: US carotids : LIJ DVT 25: CT CAP: R sided ground glass, RLL nodular opacity and no abdominal pelvis pathology 3/17/25: CXR clear lungs  New Hospitalization 4/10/25 - 25: [facility], Rhinovirus positive and COVID19 positive not given remdesivir as likely viral shedding, posaconazole dose decreased to 300mg PO QD  Assessments and Plan: *B/l Lung Transplant on 25 (CMV +/+, EBV +/+, Toxo -/-) on tacrolimus, mycophenolate and prednisone, patient extubated 25 - continue Valganciclovir 450mg PO BID - continue TMP/SMX 80/400 PO QD for PJP PPx - immunosuppression per primary transplant team - patient optimized from ID standpoint and can practice safe sexual intercourse with wife, counseled on safe sex practices  - patient optimized from ID standpoint and is allowed to drive/carry baby, defer decision to primary transplant team   *Latent TB (started on therapy 24), positive quantiferon 12/3/24, negative T spot - continue INH with vitamin B6, will need treatment through 8/3/25 to complete 9 months of therapy *DM with A1c of 6.8, with prednisone which can increase glucose level, additionally patient immunosupressed with tacrolimus which can induce DM - start Metformin 500mg PO QD, low dose as A1c 6.8 and patient on lower doses of prednisone, will repeat A1c in 3 months to assure improvement  - patient counseled on proper diet and exercise   *Hx of COVID19 and enterovirus infection during hospital stay 4/10/25, Hx of COVID 19 3/4/25 (treated with 5 days of remdesivir 3/5 - 3/9/25), RSV infection 24 (treated with Ribavirin  - 25) - no further indication for treatment or testing at this time   *Hx of Fungal Lung cultures positive bronchial cultures from 25 growing Candida Parapsilosis (pansensitive), Penicillium, and Paecilomyces, Fungitell 113 on  with follow ups negative and negative PJP PCR, positive galactomannan 1.15 with repeat on 3/4 negative - continue Posaconazole 300mg PO QD - posaconazole level on 25 was 2.8  *AFB culture from 12/10/24 growing Hypoxylon, this is a tree/environmental mold which may not be clinically significant especially as it was only found on one culture, though patient currently on treatment posa - continue posaconazole - if patient undergoes bronchoscopy would obtain repeat AFB and fungal cultures  *Pseudomonas Fluorecens (pan sensitive) in cultures from 25 s/p zosyn (length of courses above) - no further treatment at this time  *LIJ DVT - anticoagulation per primary transplant team  *HCM - defer vaccination at this time as patient will unlikely to clinically to respond to them due to immunosuppressed state  Follow up: 2 months with Dr. Hunter Claire

## 2025-04-23 NOTE — ASSESSMENT
[FreeTextEntry1] : 30yo male newly dx with PAH & RV failure, former smoker. S/P Bilateral Lung Transplant 1/31/2025   #IS GOAL 10-12  Tacrolimus 0.5 mg BID, 0.5 mg QD alternating (Level 4/21/25: 13.6) - Prednisone 17.5 mg for wound healing - Cellcept 750 mg daily  #OI - Valcyte 450mg BID - Bactrim SS daily - Posaconazole 200 mg BID  #LUNG TXP - Culture 2/1/25 rare penicillium - Posa 200mg BID - Transplant ID appointment pending   #GI Hx of GERD - Protonix 40 mg daily  #HEM Left IJ DVT Plan: discontinue after 3 months (5/13/25) - Eliquis 2.5 mg BID, started 2/13/25   #ID Latent TB therapy - Isoniazid 300 mg daily - Pyridoxine 50 mg daily  #ENDO DMT2 - Metformin 500mg daily (started by endo 4/16)????    FOLLOW UP - Routine weekly labs 5/05/2025 - [place] reviewed, **** - 6-Month bronch 7/2025 - Vascular appointment pending - UE duplex to be repeated 5/13/25 - Continue to follow up with Transplant ID.    RTC in 1 week with Mendez & CXR  All questions answered, used teach back method, patient verbalized understanding.  Above discussed with Dr. Brito

## 2025-04-23 NOTE — HISTORY OF PRESENT ILLNESS
[TextBox_4] : PRE Transplant History: 31 year old male PVOD associated with right heart failure who presented with acute hypoxic respiratory failure and cardiogenic shock. Admitted to CCU and treated with Shankar and milrinone. Initiated on Epoprostenol  CT Chest showed centrilobular ground-glass nodules, interlobular septal thickening, and mediastinal lymphadenopathy. Course complicated by pneumothorax following placement of PAC. He underwent bronchoscopy and EBUS on 12/10/24. Lymph node negative for malignancy.   Following stabilization in the ICU patient was transferred to RCU 24. Tested positive for RSV 24 s/p course of Ribavirin. Evaluated by dental 24 for jaw pain and found to have inflamed gingiva around #17 distal and lingual, with generalized plaque accumulation and gross debris/plaque s/p irrigation and cleaning. No active infection noted. RRT on 24 in setting of difficulty with Flolan infusion running through PICC Line. Flolan was subsequently changed to R IJ but then became symptomatic with tachycardia, flushing and chest pain. Patient was transferred to MICU for further monitoring. Transferred back to RCU 24. Left anterior chest wall Pandya catheter placemed for Flolan infusion 24.  Listed:  Surgery: BILATERAL LUNG TRANSPLANT 25   Donor:  CMV + / EBV + / Toxo - , PHS risk Y/N Recipient: CMV +  / EBV +  / Toxo -  Induction: simluect 20mg x2,, medrol 1 g  Total Ischemic Time: RIGHT LUN" LEFT LUN" Extubated:25  Hospital Course: Bilateral lung transplant 25. Extubated on 25. Postoperative course notable for right main stem bronchus with necrotic material and persistent air leak requiring prolonged chest tube placement. Fluid cultures 25 grew penicillium and paecilomyces for which he is on posaconazole. Also developed an acute L IJ DVT treated with lose dose Eliquis. He also tested positive for COVID initially on 3/4/25 treated with a 5 day course of Remdesivir. He remained asymptomatic. Patient discharged home 3/17/25.  TELE-Visit 3/19/2025: Initial visit is remote given positive COVID. He remains asymptomatic. Feels somewhat tired after climbing stairs at home. Also notes some chest tightness around the chest, which was present during his hospital stay. He is taking his medications as instructed using his pill box, which he had on him during the visit. He is taking strict aspiration precautions, which is also being enforced by his family. He is staying with his brother and designated caretaker Jayson, who is also present for the visit. He is ambulating daily without issue. Patient has vitals available for review, which were checked at home. Reported SpO2 97 - 99% on room air. /75. T 97.4 F. Wt 129 lbs.  CLINIC 3/26/2025: Patient presents to clinic for routine follow up appointment post hospital discharge.  Recent COVID infection, last RVP negative, SPI improved, best to date, denies respiratory complications. Complained of sore throat/esophageal pain X 3 days. Thyroid assessed, appears normal, thyroid panel and DSA/Allosure drawn. Chest tube sutures removed, incisions healing well, no signs of infection noted. Medications reviewed with patient and brother, patient and brother verbalized understanding.   CLINIC 25: Slight decline in spirometry, not concerning as home sandy trend is stable, DSA/Allosure repeated for further surveillance. Patient complained of sore thrat X1-2 days, denies sick contact, RVP swab sent. Patient also complained of numbness and tingling in hands and fingers, will reassess next week and consider adding gabapentin.  IS/Oi medications are stable; no changes this week. Routine labs to be repeated on Monday  CLINIC 25: RVP 4/3 + enterovirus and COVID. Started on a prednisone taper starting at 40 mg daily. Has further decline in spirometry from 2.63 last week to 2.49 today. Allosure was checked 3/26/25 and was 0.06%. DSA 0%. Has mild sore throat. No fevers. No shortness of breath. Patient's brother accompanied him today. Patient reports compliance with medications. His brother states that he is trying to encourage more physical activity.  Readmitted 4/10 - 25 for concern for respiratory symptoms, COVID infection with worsening PFTs. CT chest negative, + enterovirus on RVP. Covid was negative. Transplant ID consulted. Completed empiric course of levaquin inpatient.  CLINIC 2025: Mr. Buck was seen and examined by me and the ACP [facility] [address] [facility] [address] [facility] and I reviewed his data which was extensive the day before the scheduled visitation completely including lab data and radiographic information.  CLINIC 2025: Patient presents for routine follow up appointment. Reports *****  CLINIC 2025: Patient presents for routine follow up appointment. Reports *****    DONOR CULTURES:  CHECK UNET POD 1,3,7,10,14 2025: respiratory cx: pseudomonas fluoresecens 25 blood cx: ngtd 25 R BAL: <10k staph aureus 25 L BAL: <10k staph aureus 25 urine cx: ngtd 25: bld cx: staph epi?? - I'm not seeing this on unet?  CULTURES: : OR: rare yeast, rare mold, + pseudomonas flurosecens : OR: candida parapsilosis, s/p  caspofungin : OR: rare penicillum  25 body fluid: rare candida, rare pseudomonas fluorescens 25 body fluid cx: rare candida parapsilosis, rare penicillium, rare paecilomyces 25: BAL: <10k growth 25 BAL: ngtd 25: NGTD 3/4/25: BAL + COVID (remdesivir 3/5 - 3/10) 3/7/25: RVP + COVID 3/13: RVP + COVID   TBBX: 1 MONTH: 3/4/25: A0Bx, C4D neg 3 MONTH: 2025 A0Bx GMS and C4d negative 6 MONTH: 2025 9 MONTH: 10/2025 12 MONTH: 2026   DSA: 1 WEEK: (high risk): 25: 0%, NO DSA 2 WEEK: (high risk): 25: cPRA 0% NO DSA >2000 Late 1 month SENT 3/4/25; no Dsa, CPRA0% Repeated 3/26: 0% 3 MONTH: 2025 0% 6 MONTH  9 MONTH  12 MONTH  ALLOSURE: Late 1 month: SENT 3/4/25: 0.19% Repeat 3/26: 0.06% Repeated 2025 3 MONTH: 2025 0.06% 6 MONTH  9 MONTH  12 MONTH   SPIROMETRY  inpatient	 FVC 2.1	    FEV1 1.82 2025 inpatient	 FVC 2.21    FEV1 2.08 2025 inpatient	 FVC 1.54    FEV1 1.16 2025 inpatient	 FVC 2.07    FEV1 2.05 3/3/2025	inpatient	 FVC 1.94    FEV1 1.77 3/10/2025 inpatient	 FVC 2.04    FEV1 1.85 3/13/2025 inpatient	 FVC 2.21    FEV1 2.06 3/17/2025 inpatient	 FVC 2.06    FEV1 1.94 3/26/2025 [facility]:   FVC 2.98   FEV1 2.92 2025  [facility]:  FVC 2.70   FEV1 2.63 2025  [facility]:  FVC 2.61   FEV1 2.49 2025  [facility]:  FVC 2.53   FEV1 2.40 2025  [facility]:  FVC 2.69   FEV1 2.58

## 2025-04-24 NOTE — ASSESSMENT
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 S/P Bilateral Lung Transplant 1/31/2025    #IS GOAL 10-12  Tacrolimus 0.5 mg BID, 0.5 mg QD alternating (Level 4/21/25: 13.6) - Prednisone 17.5mg for wound healing - Cellcept 500 mg bid  #OI - Valcyte 450mg BID - Bactrim SS daily - Posaconazole 200 mg BID  #LUNG TXP - Culture 2/1/25 rare penicillium - Posa 200mg BID (level4/23: 2.7, Goal 1-1.5) - Transplant ID appointment pending   #GI Hx of GERD - Protonix 40 mg daily  #HEM Left IJ DVT Plan: discontinue after 3 months (5/13/25) - Eliquis 2.5 mg BID, started 2/13/25   #ID Latent TB therapy - Isoniazid 300 mg daily - Pyridoxine 50 mg daily   #ENDO DMT2 - Metformin 500mg daily (started by Dr. Claire  4/16) - Discontinued today - Appointment with Dr. Toro 4/30/2025     FOLLOW UP - Routine weekly labs 4/28/2025 - [place] reviewed, stable - 6-Month bronch 7/2025 - UE duplex to be repeated 5/13/25 - Continue to follow up with Dr. Garcia, Vascular - Continue to follow up with Transplant ID.    RTC in 1 week with [place] & CXR  All questions answered, used teach back method, patient verbalized understanding.  Above discussed with Dr. Cam

## 2025-04-24 NOTE — ASSESSMENT
[FreeTextEntry1] : 30yo male newly dx with PAH & RV failure, former smoker. S/P Bilateral Lung Transplant 1/31/2025    #IS GOAL 10-12  Tacrolimus 0.5 mg BID, 0.5 mg QD alternating (Level 4/21/25: 13.6) - Prednisone 17.5mg for wound healing - Cellcept 500 mg bid  #OI - Valcyte 450mg BID - Bactrim SS daily - Posaconazole 200 mg BID  #LUNG TXP - Culture 2/1/25 rare penicillium - Posa 200mg BID (level4/23: 2.7, Goal 1-1.5) - Transplant ID appointment pending   #GI Hx of GERD - Protonix 40 mg daily  #HEM Left IJ DVT Plan: discontinue after 3 months (5/13/25) - Eliquis 2.5 mg BID, started 2/13/25   #ID Latent TB therapy - Isoniazid 300 mg daily - Pyridoxine 50 mg daily   #ENDO DMT2 - Metformin 500mg daily (started by Dr. Claire  4/16) - Discontinued today - Appointment with Dr. Toro 4/30/2025     FOLLOW UP - Routine weekly labs 4/28/2025 - [place] reviewed, stable - 6-Month bronch 7/2025 - UE duplex to be repeated 5/13/25 - Continue to follow up with Dr. Garcia, Vascular - Continue to follow up with Transplant ID.    RTC in 1 week with [place] & CXR  All questions answered, used teach back method, patient verbalized understanding.  Above discussed with Dr. Cam

## 2025-04-24 NOTE — HISTORY OF PRESENT ILLNESS
[TextBox_4] : PRE Transplant History: 31 year old male PVOD associated with right heart failure who presented with acute hypoxic respiratory failure and cardiogenic shock. Admitted to CCU and treated with Shankar and milrinone. Initiated on Epoprostenol  CT Chest showed centrilobular ground-glass nodules, interlobular septal thickening, and mediastinal lymphadenopathy. Course complicated by pneumothorax following placement of PAC. He underwent bronchoscopy and EBUS on 12/10/24. Lymph node negative for malignancy.   Following stabilization in the ICU patient was transferred to RCU 24. Tested positive for RSV 24 s/p course of Ribavirin. Evaluated by dental 24 for jaw pain and found to have inflamed gingiva around #17 distal and lingual, with generalized plaque accumulation and gross debris/plaque s/p irrigation and cleaning. No active infection noted. RRT on 24 in setting of difficulty with Flolan infusion running through PICC Line. Flolan was subsequently changed to R IJ but then became symptomatic with tachycardia, flushing and chest pain. Patient was transferred to MICU for further monitoring. Transferred back to RCU 24. Left anterior chest wall Pandya catheter placemed for Flolan infusion 24.  Listed:  Surgery: BILATERAL LUNG TRANSPLANT 25   Donor:  CMV + / EBV + / Toxo - , PHS risk Y/N Recipient: CMV +  / EBV +  / Toxo -  Induction: simluect 20mg x2,, medrol 1 g  Total Ischemic Time: RIGHT LUN" LEFT LUN" Extubated:25  Hospital Course: Bilateral lung transplant 25. Extubated on 25. Postoperative course notable for right main stem bronchus with necrotic material and persistent air leak requiring prolonged chest tube placement. Fluid cultures 25 grew penicillium and paecilomyces for which he is on posaconazole. Also developed an acute L IJ DVT treated with lose dose Eliquis. He also tested positive for COVID initially on 3/4/25 treated with a 5 day course of Remdesivir. He remained asymptomatic. Patient discharged home 3/17/25.  TELE-Visit 3/19/2025: Initial visit is remote given positive COVID. He remains asymptomatic. Feels somewhat tired after climbing stairs at home. Also notes some chest tightness around the chest, which was present during his hospital stay. He is taking his medications as instructed using his pill box, which he had on him during the visit. He is taking strict aspiration precautions, which is also being enforced by his family. He is staying with his brother and designated caretaker Jayson, who is also present for the visit. He is ambulating daily without issue. Patient has vitals available for review, which were checked at home. Reported SpO2 97 - 99% on room air. /75. T 97.4 F. Wt 129 lbs.  CLINIC 3/26/2025: Patient presents to clinic for routine follow up appointment post hospital discharge.  Recent COVID infection, last RVP negative, SPI improved, best to date, denies respiratory complications. Complained of sore throat/esophageal pain X 3 days. Thyroid assessed, appears normal, thyroid panel and DSA/Allosure drawn. Chest tube sutures removed, incisions healing well, no signs of infection noted. Medications reviewed with patient and brother, patient and brother verbalized understanding.   CLINIC 25: Slight decline in spirometry, not concerning as home sandy trend is stable, DSA/Allosure repeated for further surveillance. Patient complained of sore thrat X1-2 days, denies sick contact, RVP swab sent. Patient also complained of numbness and tingling in hands and fingers, will reassess next week and consider adding gabapentin.  IS/Oi medications are stable; no changes this week. Routine labs to be repeated on Monday  CLINIC 25: RVP 4/3 + enterovirus and COVID. Started on a prednisone taper starting at 40 mg daily. Has further decline in spirometry from 2.63 last week to 2.49 today. Allosure was checked 3/26/25 and was 0.06%. DSA 0%. Has mild sore throat. No fevers. No shortness of breath. Patient's brother accompanied him today. Patient reports compliance with medications. His brother states that he is trying to encourage more physical activity.  Readmitted 4/10 - 25 for concern for respiratory symptoms, COVID infection with worsening PFTs. CT chest negative, + enterovirus on RVP. Covid was negative. Transplant ID consulted. Completed empiric course of levaquin inpatient.  CLINIC 2025: Mr. Buck was seen and examined by me and the ACP on 2025 at [facility] [address] in a transplant office and I reviewed his data which was extensive the day before the scheduled visitation completely including lab data and radiographic information.  CLINIC 2025: Patient clinically stable, spirometry improved, denies respiratory complications. Cellecpt increased to 500 mg BID. Posa decreased to 200mg as trough is 2.7.  Metformin 500mg daily started by Dr. Claire on ; discontinued today as Metformin is not a preferred drug of choice for post-transplant patients. an appointment was facilitated with Dr. Toro on 25.      DONOR CULTURES:  CHECK UNET POD 1,3,7,10,14 2025: respiratory cx: pseudomonas fluoresecens 25 blood cx: ngtd 25 R BAL: <10k staph aureus 25 L BAL: <10k staph aureus 25 urine cx: ngtd 25: bld cx: staph epi?? - I'm not seeing this on unet?  CULTURES: : OR: rare yeast, rare mold, + pseudomonas flurosecens : OR: candida parapsilosis, s/p  caspofungin : OR: rare penicillum  25 body fluid: rare candida, rare pseudomonas fluorescens 25 body fluid cx: rare candida parapsilosis, rare penicillium, rare paecilomyces 25: BAL: <10k growth 25 BAL: ngtd 25: NGTD 3/4/25: BAL + COVID (remdesivir 3/5 - 3/10) 3/7/25: RVP + COVID 3/13: RVP + COVID   TBBX: 1 MONTH: 3/4/25: A0Bx, C4D neg 3 MONTH: 2025: A0B, C4d negative 6 MONTH: 2025  9 MONTH: 10/2025 12 MONTH: 2026   DSA: 1 WEEK: (high risk): 25: 0%, NO DSA 2 WEEK: (high risk): 25: cPRA 0% NO DSA >2000 Late 1 month SENT 3/4/25; no Dsa, CPRA0% Repeated 3/26: 0% 3 MONTH:  pending 6 MONTH: 2025  9 MONTH: 10/2025  12 MONTH: 2026  ALLOSURE: Late 1 month: SENT 3/4/25: 0.19% Repeat 3/26: 0.06% Repeated 2025 pending 3 MONTH:  2025 pending 6 MONTH: 2025  9 MONTH: 10/2025  12 MONTH: 2026   SPIROMETRY  inpatient	 FVC 2.1	    FEV1 1.82 2025 inpatient	 FVC 2.21    FEV1 2.08 2025 inpatient	 FVC 1.54    FEV1 1.16 2025 inpatient	 FVC 2.07    FEV1 2.05 3/3/2025	inpatient	 FVC 1.94    FEV1 1.77 3/10/2025 inpatient	 FVC 2.04    FEV1 1.85 3/13/2025 inpatient	 FVC 2.21    FEV1 2.06 3/17/2025 inpatient	 FVC 2.06    FEV1 1.94 3/26/2025 [facility]: FVC 2.98   FEV1 2.92 2025 [facility]:  FVC 2.70   FEV1 2.63 2025 [facility]:  FVC 2.61   FEV1 2.49 2025 [facility]:  FVC 2.53   FEV1 2.40 2025 [facility]:  FVC 2.69   FEV1 2.58

## 2025-04-24 NOTE — HISTORY OF PRESENT ILLNESS
[TextBox_4] : PRE Transplant History: 31 year old male PVOD associated with right heart failure who presented with acute hypoxic respiratory failure and cardiogenic shock. Admitted to CCU and treated with Shankar and milrinone. Initiated on Epoprostenol  CT Chest showed centrilobular ground-glass nodules, interlobular septal thickening, and mediastinal lymphadenopathy. Course complicated by pneumothorax following placement of PAC. He underwent bronchoscopy and EBUS on 12/10/24. Lymph node negative for malignancy.   Following stabilization in the ICU patient was transferred to RCU 24. Tested positive for RSV 24 s/p course of Ribavirin. Evaluated by dental 24 for jaw pain and found to have inflamed gingiva around #17 distal and lingual, with generalized plaque accumulation and gross debris/plaque s/p irrigation and cleaning. No active infection noted. RRT on 24 in setting of difficulty with Flolan infusion running through PICC Line. Flolan was subsequently changed to R IJ but then became symptomatic with tachycardia, flushing and chest pain. Patient was transferred to MICU for further monitoring. Transferred back to RCU 24. Left anterior chest wall Pandya catheter placemed for Flolan infusion 24.  Listed:  Surgery: BILATERAL LUNG TRANSPLANT 25   Donor:  CMV + / EBV + / Toxo - , PHS risk Y/N Recipient: CMV +  / EBV +  / Toxo -  Induction: simluect 20mg x2,, medrol 1 g  Total Ischemic Time: RIGHT LUN" LEFT LUN" Extubated:25  Hospital Course: Bilateral lung transplant 25. Extubated on 25. Postoperative course notable for right main stem bronchus with necrotic material and persistent air leak requiring prolonged chest tube placement. Fluid cultures 25 grew penicillium and paecilomyces for which he is on posaconazole. Also developed an acute L IJ DVT treated with lose dose Eliquis. He also tested positive for COVID initially on 3/4/25 treated with a 5 day course of Remdesivir. He remained asymptomatic. Patient discharged home 3/17/25.  TELE-Visit 3/19/2025: Initial visit is remote given positive COVID. He remains asymptomatic. Feels somewhat tired after climbing stairs at home. Also notes some chest tightness around the chest, which was present during his hospital stay. He is taking his medications as instructed using his pill box, which he had on him during the visit. He is taking strict aspiration precautions, which is also being enforced by his family. He is staying with his brother and designated caretaker Jayson, who is also present for the visit. He is ambulating daily without issue. Patient has vitals available for review, which were checked at home. Reported SpO2 97 - 99% on room air. /75. T 97.4 F. Wt 129 lbs.  CLINIC 3/26/2025: Patient presents to clinic for routine follow up appointment post hospital discharge.  Recent COVID infection, last RVP negative, SPI improved, best to date, denies respiratory complications. Complained of sore throat/esophageal pain X 3 days. Thyroid assessed, appears normal, thyroid panel and DSA/Allosure drawn. Chest tube sutures removed, incisions healing well, no signs of infection noted. Medications reviewed with patient and brother, patient and brother verbalized understanding.   CLINIC 25: Slight decline in spirometry, not concerning as home sandy trend is stable, DSA/Allosure repeated for further surveillance. Patient complained of sore thrat X1-2 days, denies sick contact, RVP swab sent. Patient also complained of numbness and tingling in hands and fingers, will reassess next week and consider adding gabapentin.  IS/Oi medications are stable; no changes this week. Routine labs to be repeated on Monday  CLINIC 25: RVP 4/3 + enterovirus and COVID. Started on a prednisone taper starting at 40 mg daily. Has further decline in spirometry from 2.63 last week to 2.49 today. Allosure was checked 3/26/25 and was 0.06%. DSA 0%. Has mild sore throat. No fevers. No shortness of breath. Patient's brother accompanied him today. Patient reports compliance with medications. His brother states that he is trying to encourage more physical activity.  Readmitted 4/10 - 25 for concern for respiratory symptoms, COVID infection with worsening PFTs. CT chest negative, + enterovirus on RVP. Covid was negative. Transplant ID consulted. Completed empiric course of levaquin inpatient.  CLINIC 2025: Mr. Buck was seen and examined by me and the ACP on 2025 [facility] [address] [facility] [address] in a transplant office and I reviewed his data which was extensive the day before the scheduled visitation completely including lab data and radiographic information.  CLINIC 2025: Patient clinically stable, spirometry improved, denies respiratory complications. Cellecpt increased to 500 mg BID. Posa decreased to 200mg as trough is 2.7.  Metformin 500mg daily started by Dr. Claire on ; discontinued today as Metformin is not a preferred drug of choice for post-transplant patients. an appointment was facilitated with Dr. Toro on 25.      DONOR CULTURES:  CHECK UNET POD 1,3,7,10,14 2025: respiratory cx: pseudomonas fluoresecens 25 blood cx: ngtd 25 R BAL: <10k staph aureus 25 L BAL: <10k staph aureus 25 urine cx: ngtd 25: bld cx: staph epi?? - I'm not seeing this on unet?  CULTURES: : OR: rare yeast, rare mold, + pseudomonas flurosecens : OR: candida parapsilosis, s/p  caspofungin : OR: rare penicillum  25 body fluid: rare candida, rare pseudomonas fluorescens 25 body fluid cx: rare candida parapsilosis, rare penicillium, rare paecilomyces 25: BAL: <10k growth 25 BAL: ngtd 25: NGTD 3/4/25: BAL + COVID (remdesivir 3/5 - 3/10) 3/7/25: RVP + COVID 3/13: RVP + COVID   TBBX: 1 MONTH: 3/4/25: A0Bx, C4D neg 3 MONTH: 2025: A0B, C4d negative 6 MONTH: 2025  9 MONTH: 10/2025 12 MONTH: 2026   DSA: 1 WEEK: (high risk): 25: 0%, NO DSA 2 WEEK: (high risk): 25: cPRA 0% NO DSA >2000 Late 1 month SENT 3/4/25; no Dsa, CPRA0% Repeated 3/26: 0% 3 MONTH:  pending 6 MONTH: 2025  9 MONTH: 10/2025  12 MONTH: 2026  ALLOSURE: Late 1 month: SENT 3/4/25: 0.19% Repeat 3/26: 0.06% Repeated 2025 pending 3 MONTH:  2025 pending 6 MONTH: 2025  9 MONTH: 10/2025  12 MONTH: 2026   SPIROMETRY  inpatient	 FVC 2.1	    FEV1 1.82 2025 inpatient	 FVC 2.21    FEV1 2.08 2025 inpatient	 FVC 1.54    FEV1 1.16 2025 inpatient	 FVC 2.07    FEV1 2.05 3/3/2025	inpatient	 FVC 1.94    FEV1 1.77 3/10/2025 inpatient	 FVC 2.04    FEV1 1.85 3/13/2025 inpatient	 FVC 2.21    FEV1 2.06 3/17/2025 inpatient	 FVC 2.06    FEV1 1.94 3/26/2025 [facility]: FVC 2.98   FEV1 2.92 2025 [facility]:  FVC 2.70   FEV1 2.63 2025 [facility]:  FVC 2.61   FEV1 2.49 2025 [facility]:  FVC 2.53   FEV1 2.40 2025 [facility]:  FVC 2.69   FEV1 2.58

## 2025-04-24 NOTE — HISTORY OF PRESENT ILLNESS
[TextBox_4] : PRE Transplant History: 31 year old male PVOD associated with right heart failure who presented with acute hypoxic respiratory failure and cardiogenic shock. Admitted to CCU and treated with Shankar and milrinone. Initiated on Epoprostenol  CT Chest showed centrilobular ground-glass nodules, interlobular septal thickening, and mediastinal lymphadenopathy. Course complicated by pneumothorax following placement of PAC. He underwent bronchoscopy and EBUS on 12/10/24. Lymph node negative for malignancy.   Following stabilization in the ICU patient was transferred to RCU 24. Tested positive for RSV 24 s/p course of Ribavirin. Evaluated by dental 24 for jaw pain and found to have inflamed gingiva around #17 distal and lingual, with generalized plaque accumulation and gross debris/plaque s/p irrigation and cleaning. No active infection noted. RRT on 24 in setting of difficulty with Flolan infusion running through PICC Line. Flolan was subsequently changed to R IJ but then became symptomatic with tachycardia, flushing and chest pain. Patient was transferred to MICU for further monitoring. Transferred back to RCU 24. Left anterior chest wall Pandya catheter placemed for Flolan infusion 24.  Listed:  Surgery: BILATERAL LUNG TRANSPLANT 25   Donor:  CMV + / EBV + / Toxo - , PHS risk Y/N Recipient: CMV +  / EBV +  / Toxo -  Induction: simluect 20mg x2,, medrol 1 g  Total Ischemic Time: RIGHT LUN" LEFT LUN" Extubated:25  Hospital Course: Bilateral lung transplant 25. Extubated on 25. Postoperative course notable for right main stem bronchus with necrotic material and persistent air leak requiring prolonged chest tube placement. Fluid cultures 25 grew penicillium and paecilomyces for which he is on posaconazole. Also developed an acute L IJ DVT treated with lose dose Eliquis. He also tested positive for COVID initially on 3/4/25 treated with a 5 day course of Remdesivir. He remained asymptomatic. Patient discharged home 3/17/25.  TELE-Visit 3/19/2025: Initial visit is remote given positive COVID. He remains asymptomatic. Feels somewhat tired after climbing stairs at home. Also notes some chest tightness around the chest, which was present during his hospital stay. He is taking his medications as instructed using his pill box, which he had on him during the visit. He is taking strict aspiration precautions, which is also being enforced by his family. He is staying with his brother and designated caretaker Jayson, who is also present for the visit. He is ambulating daily without issue. Patient has vitals available for review, which were checked at home. Reported SpO2 97 - 99% on room air. /75. T 97.4 F. Wt 129 lbs.  CLINIC 3/26/2025: Patient presents to clinic for routine follow up appointment post hospital discharge.  Recent COVID infection, last RVP negative, SPI improved, best to date, denies respiratory complications. Complained of sore throat/esophageal pain X 3 days. Thyroid assessed, appears normal, thyroid panel and DSA/Allosure drawn. Chest tube sutures removed, incisions healing well, no signs of infection noted. Medications reviewed with patient and brother, patient and brother verbalized understanding.   CLINIC 25: Slight decline in spirometry, not concerning as home sandy trend is stable, DSA/Allosure repeated for further surveillance. Patient complained of sore thrat X1-2 days, denies sick contact, RVP swab sent. Patient also complained of numbness and tingling in hands and fingers, will reassess next week and consider adding gabapentin.  IS/Oi medications are stable; no changes this week. Routine labs to be repeated on Monday  CLINIC 25: RVP 4/3 + enterovirus and COVID. Started on a prednisone taper starting at 40 mg daily. Has further decline in spirometry from 2.63 last week to 2.49 today. Allosure was checked 3/26/25 and was 0.06%. DSA 0%. Has mild sore throat. No fevers. No shortness of breath. Patient's brother accompanied him today. Patient reports compliance with medications. His brother states that he is trying to encourage more physical activity.  Readmitted 4/10 - 25 for concern for respiratory symptoms, COVID infection with worsening PFTs. CT chest negative, + enterovirus on RVP. Covid was negative. Transplant ID consulted. Completed empiric course of levaquin inpatient.  CLINIC 2025: Mr. Buck was seen and examined by me and the ACP on 20[facility] at [address] [facility] [address] in a transplant office and I reviewed his data which was extensive the day before the scheduled visitation completely including lab data and radiographic information.  CLINIC 2025: Patient clinically stable, spirometry improved, denies respiratory complications. Cellecpt increased to 500 mg BID. Posa decreased to 200mg as trough is 2.7.  Metformin 500mg daily started by Dr. Claire on ; discontinued today as Metformin is not a preferred drug of choice for post-transplant patients. an appointment was facilitated with Dr. Toro on 25.      DONOR CULTURES:  CHECK UNET POD 1,3,7,10,14 2025: respiratory cx: pseudomonas fluoresecens 25 blood cx: ngtd 25 R BAL: <10k staph aureus 25 L BAL: <10k staph aureus 25 urine cx: ngtd 25: bld cx: staph epi?? - I'm not seeing this on unet?  CULTURES: : OR: rare yeast, rare mold, + pseudomonas flurosecens : OR: candida parapsilosis, s/p  caspofungin : OR: rare penicillum  25 body fluid: rare candida, rare pseudomonas fluorescens 25 body fluid cx: rare candida parapsilosis, rare penicillium, rare paecilomyces 25: BAL: <10k growth 25 BAL: ngtd 25: NGTD 3/4/25: BAL + COVID (remdesivir 3/5 - 3/10) 3/7/25: RVP + COVID 3/13: RVP + COVID   TBBX: 1 MONTH: 3/4/25: A0Bx, C4D neg 3 MONTH: 2025: A0B, C4d negative 6 MONTH: 2025  9 MONTH: 10/2025 12 MONTH: 2026   DSA: 1 WEEK: (high risk): 25: 0%, NO DSA 2 WEEK: (high risk): 25: cPRA 0% NO DSA >2000 Late 1 month SENT 3/4/25; no Dsa, CPRA0% Repeated 3/26: 0% 3 MONTH:  pending 6 MONTH: 2025  9 MONTH: 10/2025  12 MONTH: 2026  ALLOSURE: Late 1 month: SENT 3/4/25: 0.19% Repeat 3/26: 0.06% Repeated 2025 pending 3 MONTH:  2025 pending 6 MONTH: 2025  9 MONTH: 10/2025  12 MONTH: 2026   SPIROMETRY  inpatient	 FVC 2.1	    FEV1 1.82 2025 inpatient	 FVC 2.21    FEV1 2.08 2025 inpatient	 FVC 1.54    FEV1 1.16 2025 inpatient	 FVC 2.07    FEV1 2.05 3/3/2025	inpatient	 FVC 1.94    FEV1 1.77 3/10/2025 inpatient	 FVC 2.04    FEV1 1.85 3/13/2025 inpatient	 FVC 2.21    FEV1 2.06 3/17/2025 inpatient	 FVC 2.06    FEV1 1.94 3/26/2025 [facility]: FVC 2.98   FEV1 2.92 2025 [facility]:  FVC 2.70   FEV1 2.63 2025 [facility]:  FVC 2.61   FEV1 2.49 2025 [facility]:  FVC 2.53   FEV1 2.40 2025 [facility]:  FVC 2.69   FEV1 2.58

## 2025-04-24 NOTE — END OF VISIT
[Time Spent: ___ minutes] : I have spent [unfilled] minutes of time on the encounter which excludes teaching and separately reported services. [FreeTextEntry3] : 30 year old male presented initially with cardiogenic shock and acute on chronic RH failure in setting of severe Group 1 PAH / PVOD 11/22/2024 who is s/p bilateral LTx on 1/31/2025.   Clinically stable, on room air Spirometry: FEV1: 2.58<--2.4<--2.49, improved today PE: CTA bilaterally Tacrolimus level on 04/21 was 13.6 ng/mL, cont 0.5 mg QD and 0.5 mg BID (Target 10 - 12) Increase Cellcept to 500 mg daily (lowered to 250mg for recent viral infection) Back on Prednisone 17.5 mg daily TBBX 4/17/25 A0Bx, negative C4d BAL 4/17, bacterial cultures negative, but (+) rhinovirus DSA 4/16/25 - negative Allosure 04/16/25 - 0.06% OI PPX: Bactrim, Valcyte BAL on 02/01 growing penicillium spp Posaconazole level on 04/21 was 2.7, goal is 1.0-1.5 Will plan to lower to 200 mg QD and repeat level in 7-10 days CMV/EBV PCR on 04/21 was negative COVID/Enterovirus: Prednisone taper. Completed empiric course of levofloxacin. IgG level 4/2/25 956. LTBI: INH/B6. Transplant ID follow up L IJ Clot: Last UE/LE Duplex negative 3/12/25. Eliquis 2.5 mg BID. Stop after 3 months (5/12/25) GERD: Aspiration precautions.

## 2025-04-24 NOTE — ASSESSMENT
[FreeTextEntry1] : 30yo male newly dx with PAH & RV failure, former smoker. S/P Bilateral Lung Transplant 1/31/2025    #IS GOAL 10-12  Tacrolimus 0.5 mg BID, 0.5 mg QD alternating (Level 4/21/25: 13.6) - Prednisone 17.5mg for wound healing - Cellcept 500 mg bid  #OI - Valcyte 450mg BID - Bactrim SS daily - Posaconazole 200 mg BID  #LUNG TXP - Culture 2/1/25 rare penicillium - Posa 200mg BID (level4/23: 2.7, Goal 1-1.5) - Transplant ID appointment pending   #GI Hx of GERD - Protonix 40 mg daily  #HEM Left IJ DVT Plan: discontinue after 3 months (5/13/25) - Eliquis 2.5 mg BID, started 2/13/25   #ID Latent TB therapy - Isoniazid 300 mg daily - Pyridoxine 50 mg daily   #ENDO DMT2 - Metformin 500mg daily (started by Dr. Claire  4/16) - Discontinued today - Appointment with Dr. Toro 4/30/2025     FOLLOW UP - Routine weekly labs 4/28/2025 - [place] reviewed, stable - 6-Month bronch 7/2025 - UE duplex to be repeated 5/13/25 - Continue to follow up with Dr. Garcia, Vascular - Continue to follow up with Transplant ID.    RTC in 1 week with [place] & CXR  All questions answered, used teach back method, patient verbalized understanding.  Above discussed with Dr. Cam

## 2025-05-01 NOTE — ASSESSMENT
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  # T2DM # On systemic steroid Target HbA1c <7% HbA1c 6.8% on 4/14/2025 On prednisone 17.5mg daily. Dysglycemia expected to improve with prednisone titration  BG monitoring: Advised checking BG 1-2 times per day. Will send glucometer supply. Pt to contact office if BG >180 multiple times  Medication regimen: Start januvia 100mg daily. In particular sitagliptin has not shown to increase the risk for/ exacerbate CHF, even though with previous HF history. Also no data on post-lung transplant contraindications  Lifestyle modifications: Recommend increasing weekly physical activity with goal 150 minutes/week and dietary modifications- limiting sugary beverages and fried foods, incorporating more fruits/vegetables and whole grains in diet  Monitoring for complications: Will make ophthalmology referral for annual diabetes eye screen. On 4/2025, , normal eGFR. Check MACR today.  # HLD - 4/14/25- , , HDL 81, LDL 71 - No hx of ASCVD - Given young age and likelihood of diabetes due to steroid, c/w lifestyle measures  RTC 4 months

## 2025-05-01 NOTE — PHYSICAL EXAM
[TextEntry] : GENERAL: awake, NAD   HEENT: NCAT   NECK: supple, no LAD  CARDIAC: RRR, S1, S2 present   LUNGS: CTA b/l, comfortable respirations on room air   ABD: Soft, NT, ND   EXT: warm, well-perfused, no edema   SKIN: No lesions noted. Dry and warm 
[TextEntry] : GENERAL: awake, NAD   HEENT: NCAT   NECK: supple, no LAD  CARDIAC: RRR, S1, S2 present   LUNGS: CTA b/l, comfortable respirations on room air   ABD: Soft, NT, ND   EXT: warm, well-perfused, no edema   SKIN: No lesions noted. Dry and warm 
Negative

## 2025-05-01 NOTE — HISTORY OF PRESENT ILLNESS
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  Patient had bilateral lung transplant for PAH on 1/2025 and on immunosuppressant and prednisone 17.5mg daily. On 4/2025, newly dx'd with T2DM on 4/2025 while on prednisone and was briefly on metformin 500mg BID by PCP- discontinued, as not preferred regimen as per transplant team. Pt denies acute complaints today.  Regarding diabetes history, Diagnosed: 4/2025   A1c: 6.8% on 4/14/2025   Current regimen: None Previous meds: metformin 500mg BID   Glycemic monitoring: Pt is not monitoring BG at home   Diet: Rice/roti- not portioned, chicken/beef/lamb, veggies and fruits, egg Used to eat sweets but no longer   Physical activity: Walks regularly   Diabetes complications and comorbidities: On 4/2025, , normal eGFR. No MACR noted. Pt has not seen ophthalmologist before. Macrovascular complications: Denies CVA or CAD Dyslipidemia: 4/14/25- , , HDL 81, LDL 71. Pt is not on statin currently  Family history of DM: Parents with DM   Social: Tobacco use- Denies ETOH- Denies

## 2025-05-02 NOTE — PHYSICAL EXAM
[No Acute Distress] : no acute distress [Normal Appearance] : normal appearance [Normal Rate/Rhythm] : normal rate/rhythm [Normal S1, S2] : normal s1, s2 [No Murmurs] : no murmurs [No Resp Distress] : no resp distress [Clear to Auscultation Bilaterally] : clear to auscultation bilaterally [Benign] : benign [Normal Gait] : normal gait [No Clubbing] : no clubbing [No Edema] : no edema [Normal Color/ Pigmentation] : normal color/ pigmentation [Oriented x3] : oriented x3

## 2025-05-05 NOTE — ASSESSMENT
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 S/P Bilateral Lung Transplant 1/31/2025   #IS GOAL 10-12  Tacrolimus 0.5 mg BID, 0.5 mg QD alternating (Level 4/28/25: 9.9) - Prednisone 17.5 mg for wound healing - Cellcept 750 mg daily  #OI - Valcyte 450mg BID - Bactrim SS daily - Posaconazole 200 mg BID  #LUNG TXP - Culture 2/1/25 rare penicillium - Posa 200mg BID - Transplant ID appointment pending   #GI Hx of GERD - Protonix 40 mg daily  #HEM Left IJ DVT Plan: discontinue after 3 months (5/13/25) - Eliquis 2.5 mg BID, started 2/13/25   #ID Latent TB therapy - Isoniazid 300 mg daily - Pyridoxine 50 mg daily  #ENDO DMT2 - Januvia 100mg daily, started by endo 4/30/25    FOLLOW UP - Routine weekly labs 5/05/2025 - Mendez reviewed, stable - 6-Month bronch 7/2025 - Vascular appointment pending - UE duplex to be repeated 5/13/25 - Continue to follow up with Transplant ID. - Continue to follow up with endocrinology   RTC in 1 week with Mendez & CXR  All questions answered, used teach back method, patient verbalized understanding.  Above discussed with Dr. Brito

## 2025-05-05 NOTE — ASSESSMENT
[FreeTextEntry1] : 30yo male newly dx with PAH & RV failure, former smoker. S/P Bilateral Lung Transplant 1/31/2025   #IS GOAL 10-12  Tacrolimus 0.5 mg BID, 0.5 mg QD alternating (Level 4/28/25: 9.9) - Prednisone 17.5 mg for wound healing - Cellcept 750 mg daily  #OI - Valcyte 450mg BID - Bactrim SS daily - Posaconazole 200 mg BID  #LUNG TXP - Culture 2/1/25 rare penicillium - Posa 200mg BID - Transplant ID appointment pending   #GI Hx of GERD - Protonix 40 mg daily  #HEM Left IJ DVT Plan: discontinue after 3 months (5/13/25) - Eliquis 2.5 mg BID, started 2/13/25   #ID Latent TB therapy - Isoniazid 300 mg daily - Pyridoxine 50 mg daily  #ENDO DMT2 - Januvia 100mg daily, started by endo 4/30/25    FOLLOW UP - Routine weekly labs 5/05/2025 - Mendez reviewed, stable - 6-Month bronch 7/2025 - Vascular appointment pending - UE duplex to be repeated 5/13/25 - Continue to follow up with Transplant ID. - Continue to follow up with endocrinology   RTC in 1 week with Mendez & CXR  All questions answered, used teach back method, patient verbalized understanding.  Above discussed with Dr. Brito

## 2025-05-05 NOTE — HISTORY OF PRESENT ILLNESS
[TextBox_4] : PRE Transplant History: 31 year old male PVOD associated with right heart failure who presented with acute hypoxic respiratory failure and cardiogenic shock. Admitted to CCU and treated with Shankar and milrinone. Initiated on Epoprostenol  CT Chest showed centrilobular ground-glass nodules, interlobular septal thickening, and mediastinal lymphadenopathy. Course complicated by pneumothorax following placement of PAC. He underwent bronchoscopy and EBUS on 12/10/24. Lymph node negative for malignancy.   Following stabilization in the ICU patient was transferred to RCU 24. Tested positive for RSV 24 s/p course of Ribavirin. Evaluated by dental 24 for jaw pain and found to have inflamed gingiva around #17 distal and lingual, with generalized plaque accumulation and gross debris/plaque s/p irrigation and cleaning. No active infection noted. RRT on 24 in setting of difficulty with Flolan infusion running through PICC Line. Flolan was subsequently changed to R IJ but then became symptomatic with tachycardia, flushing and chest pain. Patient was transferred to MICU for further monitoring. Transferred back to RCU 24. Left anterior chest wall Pandya catheter placemed for Flolan infusion 24.  Listed:  Surgery: BILATERAL LUNG TRANSPLANT 25   Donor:  CMV + / EBV + / Toxo - , PHS risk Y/N Recipient: CMV +  / EBV +  / Toxo -  Induction: simluect 20mg x2,, medrol 1 g  Total Ischemic Time: RIGHT LUN" LEFT LUN" Extubated:25  Hospital Course: Bilateral lung transplant 25. Extubated on 25. Postoperative course notable for right main stem bronchus with necrotic material and persistent air leak requiring prolonged chest tube placement. Fluid cultures 25 grew penicillium and paecilomyces for which he is on posaconazole. Also developed an acute L IJ DVT treated with lose dose Eliquis. He also tested positive for COVID initially on 3/4/25 treated with a 5 day course of Remdesivir. He remained asymptomatic. Patient discharged home 3/17/25.  TELE-Visit 3/19/2025: Initial visit is remote given positive COVID. He remains asymptomatic. Feels somewhat tired after climbing stairs at home. Also notes some chest tightness around the chest, which was present during his hospital stay. He is taking his medications as instructed using his pill box, which he had on him during the visit. He is taking strict aspiration precautions, which is also being enforced by his family. He is staying with his brother and designated caretaker Jayson, who is also present for the visit. He is ambulating daily without issue. Patient has vitals available for review, which were checked at home. Reported SpO2 97 - 99% on room air. /75. T 97.4 F. Wt 129 lbs.  CLINIC 3/26/2025: Patient presents to clinic for routine follow up appointment post hospital discharge.  Recent COVID infection, last RVP negative, SPI improved, best to date, denies respiratory complications. Complained of sore throat/esophageal pain X 3 days. Thyroid assessed, appears normal, thyroid panel and DSA/Allosure drawn. Chest tube sutures removed, incisions healing well, no signs of infection noted. Medications reviewed with patient and brother, patient and brother verbalized understanding.   CLINIC 25: Slight decline in spirometry, not concerning as home sandy trend is stable, DSA/Allosure repeated for further surveillance. Patient complained of sore thrat X1-2 days, denies sick contact, RVP swab sent. Patient also complained of numbness and tingling in hands and fingers, will reassess next week and consider adding gabapentin.  IS/Oi medications are stable; no changes this week. Routine labs to be repeated on Monday  CLINIC 25: RVP 4/3 + enterovirus and COVID. Started on a prednisone taper starting at 40 mg daily. Has further decline in spirometry from 2.63 last week to 2.49 today. Allosure was checked 3/26/25 and was 0.06%. DSA 0%. Has mild sore throat. No fevers. No shortness of breath. Patient's brother accompanied him today. Patient reports compliance with medications. His brother states that he is trying to encourage more physical activity.  Readmitted 4/10 - 25 for concern for respiratory symptoms, COVID infection with worsening PFTs. CT chest negative, + enterovirus on RVP. Covid was negative. Transplant ID consulted. Completed empiric course of levaquin inpatient.  CLINIC 2025: Mr. Buck was seen and examined by me and the ACP on [facility] [address] [facility] [address] [facility] office and I reviewed his data which was extensive the day before the scheduled visitation completely including lab data and radiographic information.  CLINIC 2025: CLINIC 2025: Patient clinically stable, spirometry improved, denies respiratory complications. Cellecpt increased to 500 mg BID. Posa decreased to 200mg as trough is 2.7. Metformin 500mg daily started by Dr. Claire on ; discontinued today as Metformin is not a preferred drug of choice for post-transplant patients. an appointment was facilitated with Dr. Toro on 25.  CLINIC 2025: Patient presents for routine follow up appointment. Reports feeling well. No respiratory complaints. Has intermittent feeling of chest tightness not associated with dyspnea. Also notes stiff legs in the middle of the night which improves during daytime. He reports compliance with medications.   DONOR CULTURES:  CHECK UNET POD 1,3,7,10,14 2025: respiratory cx: pseudomonas fluoresecens 25 blood cx: ngtd 25 R BAL: <10k staph aureus 25 L BAL: <10k staph aureus 25 urine cx: ngtd 25: bld cx: staph epi?? - I'm not seeing this on unet?  CULTURES: : OR: rare yeast, rare mold, + pseudomonas flurosecens : OR: candida parapsilosis, s/p  caspofungin : OR: rare penicillum  25 body fluid: rare candida, rare pseudomonas fluorescens 25 body fluid cx: rare candida parapsilosis, rare penicillium, rare paecilomyces 25: BAL: <10k growth 25 BAL: ngtd 25: NGTD 3/4/25: BAL + COVID (remdesivir 3/5 - 3/10) 3/7/25: RVP + COVID 3/13: RVP + COVID   TBBX: 1 MONTH: 3/4/25: A0Bx, C4D neg 3 MONTH: 2025 A0Bx GMS and C4d negative 6 MONTH: 2025 9 MONTH: 10/2025 12 MONTH: 2026   DSA: 1 WEEK: (high risk): 25: 0%, NO DSA 2 WEEK: (high risk): 25: cPRA 0% NO DSA >2000 Late 1 month SENT 3/4/25; no Dsa, CPRA0% Repeated 3/26: 0% 3 MONTH: 2025 0% 6 MONTH  9 MONTH  12 MONTH  ALLOSURE: Late 1 month: SENT 3/4/25: 0.19% Repeat 3/26: 0.06% Repeated 2025 3 MONTH: 2025 0.06% 6 MONTH  9 MONTH  12 MONTH   SPIROMETRY  inpatient	 FVC 2.1	    FEV1 1.82 2025 inpatient	 FVC 2.21    FEV1 2.08 2025 inpatient	 FVC 1.54    FEV1 1.16 2025 inpatient	 FVC 2.07    FEV1 2.05 3/3/2025	inpatient	 FVC 1.94    FEV1 1.77 3/10/2025 inpatient	 FVC 2.04    FEV1 1.85 3/13/2025 inpatient	 FVC 2.21    FEV1 2.06 3/17/2025 inpatient	 FVC 2.06    FEV1 1.94 3/26/2025 [facility]:   FVC 2.98   FEV1 2.92 2025 [facility]:  FVC 2.70   FEV1 2.63 2025 [facility]:  FVC 2.61   FEV1 2.49 2025 [facility]:  FVC 2.53   FEV1 2.40 2025 [facility]:  FVC 2.69   FEV1 2.58 2025 [facility]     FVC 2.65    FEV1 2.58

## 2025-05-05 NOTE — END OF VISIT
[Time Spent: ___ minutes] : I have spent [unfilled] minutes of time on the encounter which excludes teaching and separately reported services. [FreeTextEntry3] : 30 year old male presented initially with cardiogenic shock and acute on chronic RH failure in setting of severe Group 1 PAH / PVOD 11/22/2024 who is s/p bilateral LTx on 1/31/2025.  Clinically stable, on room air Spirometry: stable from prior spirometry 4/23/25. FEV1 remains 2.58 L PE: CTAB. No LE edema. Immunosuppression: Tacrolimus level 4/28/25 was 9.9 ng/mL. Continue alternating doses of 0.5 mg QD and 0.5 mg BID (Target 10 - 12). Cellcept 250 mg BID. Prednisone 17.5 mg daily TBBX 4/17/25 A0Bx, negative C4d BAL 4/17/25: bacterial cultures negative, but (+) rhinovirus DSA 4/16/25 - negative Allosure 04/16/25 - 0.06% OI PPX: Bactrim, Valcyte, Posaconazole (BAL 2/1 growing penicillium) Last level 4/28/25: 1.6 LTBI: INH/B6. Transplant ID follow up L IJ Clot: Last UE/LE Duplex negative 3/12/25. Eliquis 2.5 mg BID. Stop after 3 months (5/12/25) GERD: Aspiration precautions

## 2025-05-05 NOTE — HISTORY OF PRESENT ILLNESS
[TextBox_4] : PRE Transplant History: 31 year old male PVOD associated with right heart failure who presented with acute hypoxic respiratory failure and cardiogenic shock. Admitted to CCU and treated with Shankar and milrinone. Initiated on Epoprostenol  CT Chest showed centrilobular ground-glass nodules, interlobular septal thickening, and mediastinal lymphadenopathy. Course complicated by pneumothorax following placement of PAC. He underwent bronchoscopy and EBUS on 12/10/24. Lymph node negative for malignancy.   Following stabilization in the ICU patient was transferred to RCU 24. Tested positive for RSV 24 s/p course of Ribavirin. Evaluated by dental 24 for jaw pain and found to have inflamed gingiva around #17 distal and lingual, with generalized plaque accumulation and gross debris/plaque s/p irrigation and cleaning. No active infection noted. RRT on 24 in setting of difficulty with Flolan infusion running through PICC Line. Flolan was subsequently changed to R IJ but then became symptomatic with tachycardia, flushing and chest pain. Patient was transferred to MICU for further monitoring. Transferred back to RCU 24. Left anterior chest wall Pandya catheter placemed for Flolan infusion 24.  Listed:  Surgery: BILATERAL LUNG TRANSPLANT 25   Donor:  CMV + / EBV + / Toxo - , PHS risk Y/N Recipient: CMV +  / EBV +  / Toxo -  Induction: simluect 20mg x2,, medrol 1 g  Total Ischemic Time: RIGHT LUN" LEFT LUN" Extubated:25  Hospital Course: Bilateral lung transplant 25. Extubated on 25. Postoperative course notable for right main stem bronchus with necrotic material and persistent air leak requiring prolonged chest tube placement. Fluid cultures 25 grew penicillium and paecilomyces for which he is on posaconazole. Also developed an acute L IJ DVT treated with lose dose Eliquis. He also tested positive for COVID initially on 3/4/25 treated with a 5 day course of Remdesivir. He remained asymptomatic. Patient discharged home 3/17/25.  TELE-Visit 3/19/2025: Initial visit is remote given positive COVID. He remains asymptomatic. Feels somewhat tired after climbing stairs at home. Also notes some chest tightness around the chest, which was present during his hospital stay. He is taking his medications as instructed using his pill box, which he had on him during the visit. He is taking strict aspiration precautions, which is also being enforced by his family. He is staying with his brother and designated caretaker Jayson, who is also present for the visit. He is ambulating daily without issue. Patient has vitals available for review, which were checked at home. Reported SpO2 97 - 99% on room air. /75. T 97.4 F. Wt 129 lbs.  CLINIC 3/26/2025: Patient presents to clinic for routine follow up appointment post hospital discharge.  Recent COVID infection, last RVP negative, SPI improved, best to date, denies respiratory complications. Complained of sore throat/esophageal pain X 3 days. Thyroid assessed, appears normal, thyroid panel and DSA/Allosure drawn. Chest tube sutures removed, incisions healing well, no signs of infection noted. Medications reviewed with patient and brother, patient and brother verbalized understanding.   CLINIC 25: Slight decline in spirometry, not concerning as home sandy trend is stable, DSA/Allosure repeated for further surveillance. Patient complained of sore thrat X1-2 days, denies sick contact, RVP swab sent. Patient also complained of numbness and tingling in hands and fingers, will reassess next week and consider adding gabapentin.  IS/Oi medications are stable; no changes this week. Routine labs to be repeated on Monday  CLINIC 25: RVP 4/3 + enterovirus and COVID. Started on a prednisone taper starting at 40 mg daily. Has further decline in spirometry from 2.63 last week to 2.49 today. Allosure was checked 3/26/25 and was 0.06%. DSA 0%. Has mild sore throat. No fevers. No shortness of breath. Patient's brother accompanied him today. Patient reports compliance with medications. His brother states that he is trying to encourage more physical activity.  Readmitted 4/10 - 25 for concern for respiratory symptoms, COVID infection with worsening PFTs. CT chest negative, + enterovirus on RVP. Covid was negative. Transplant ID consulted. Completed empiric course of levaquin inpatient.  CLINIC 2025: Mr. Buck was seen and examined by me and the ACP on 20[facility] [address] [facility] [address] [facility] office and I reviewed his data which was extensive the day before the scheduled visitation completely including lab data and radiographic information.  CLINIC 2025: CLINIC 2025: Patient clinically stable, spirometry improved, denies respiratory complications. Cellecpt increased to 500 mg BID. Posa decreased to 200mg as trough is 2.7. Metformin 500mg daily started by Dr. Claire on ; discontinued today as Metformin is not a preferred drug of choice for post-transplant patients. an appointment was facilitated with Dr. Toro on 25.  CLINIC 2025: Patient presents for routine follow up appointment. Reports feeling well. No respiratory complaints. Has intermittent feeling of chest tightness not associated with dyspnea. Also notes stiff legs in the middle of the night which improves during daytime. He reports compliance with medications.   DONOR CULTURES:  CHECK UNET POD 1,3,7,10,14 2025: respiratory cx: pseudomonas fluoresecens 25 blood cx: ngtd 25 R BAL: <10k staph aureus 25 L BAL: <10k staph aureus 25 urine cx: ngtd 25: bld cx: staph epi?? - I'm not seeing this on unet?  CULTURES: : OR: rare yeast, rare mold, + pseudomonas flurosecens : OR: candida parapsilosis, s/p  caspofungin : OR: rare penicillum  25 body fluid: rare candida, rare pseudomonas fluorescens 25 body fluid cx: rare candida parapsilosis, rare penicillium, rare paecilomyces 25: BAL: <10k growth 25 BAL: ngtd 25: NGTD 3/4/25: BAL + COVID (remdesivir 3/5 - 3/10) 3/7/25: RVP + COVID 3/13: RVP + COVID   TBBX: 1 MONTH: 3/4/25: A0Bx, C4D neg 3 MONTH: 2025 A0Bx GMS and C4d negative 6 MONTH: 2025 9 MONTH: 10/2025 12 MONTH: 2026   DSA: 1 WEEK: (high risk): 25: 0%, NO DSA 2 WEEK: (high risk): 25: cPRA 0% NO DSA >2000 Late 1 month SENT 3/4/25; no Dsa, CPRA0% Repeated 3/26: 0% 3 MONTH: 2025 0% 6 MONTH  9 MONTH  12 MONTH  ALLOSURE: Late 1 month: SENT 3/4/25: 0.19% Repeat 3/26: 0.06% Repeated 2025 3 MONTH: 2025 0.06% 6 MONTH  9 MONTH  12 MONTH   SPIROMETRY  inpatient	 FVC 2.1	    FEV1 1.82 2025 inpatient	 FVC 2.21    FEV1 2.08 2025 inpatient	 FVC 1.54    FEV1 1.16 2025 inpatient	 FVC 2.07    FEV1 2.05 3/3/2025	inpatient	 FVC 1.94    FEV1 1.77 3/10/2025 inpatient	 FVC 2.04    FEV1 1.85 3/13/2025 inpatient	 FVC 2.21    FEV1 2.06 3/17/2025 inpatient	 FVC 2.06    FEV1 1.94 3/26/2025 [facility]:   FVC 2.98   FEV1 2.92 2025 [facility]:  FVC 2.70   FEV1 2.63 2025 [facility]:  FVC 2.61   FEV1 2.49 2025 [facility]:  FVC 2.53   FEV1 2.40 2025 [facility]:  FVC 2.69   FEV1 2.58 2025 [facility]     FVC 2.65    FEV1 2.58

## 2025-05-08 NOTE — ASSESSMENT
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 S/P Bilateral Lung Transplant 1/31/2025   #IS GOAL 10-12  Tacrolimus 0.5 mg BID, 0.5 mg QD alternating (Level 5/5/25: 13.5) - Prednisone 17.5 mg for wound healing - Cellcept 750 mg daily  #OI - Valcyte 450mg BID - Bactrim SS daily - Posaconazole 200 mg BID  #LUNG TXP - Culture 2/1/25 rare penicillium - Posa 200mg BID - Transplant ID appointment pending   #GI Hx of GERD - Protonix 40 mg daily  #HEM Left IJ DVT Plan: discontinue after 3 months (5/13/25) - Eliquis 2.5 mg BID, started 2/13/25   #ID Latent TB therapy - Isoniazid 300 mg daily - Pyridoxine 50 mg daily  #ENDO DMT2 - Januvia 100mg daily, started by endo 4/30/25    FOLLOW UP - Routine weekly labs 5/12/2025 - Mendez reviewed, improved - 6-Month bronch 7/2025 - Continue to follow up with Dr. Garcia - UE duplex to be repeated in 7/2025 - Continue to follow up with Transplant ID. - Continue to follow up with endocrinology   RTC in 1 week with Mendez & CXR  All questions answered, used teach back method, patient verbalized understanding.  Above discussed with Dr. Sweeney

## 2025-05-08 NOTE — END OF VISIT
[FreeTextEntry3] : Seen and examined patient with NP/PA/ Fellow physician.  Independently verified the review of systems, physical examination, labs, radiological imaging. Also discussed with consultants to formulate eventual assessment and plan.    Assessment and plan:  Post lung transplantation Immunosuppression as above No respiratory symptoms.  Reports chest wall pain laterally on the right side with tingling Not checking home spirometry regularly -counseled ReCheck RUE dopplers-Currently on Eliquis Moved out of brother's house into parents house with wife and child.  Wife managing transplant medications. Advised to bring her in clinic  Reports leg cramps: Watch for hypokalemia hypocalcemia and hypomagnesemia.  TSH ws normal.  RTC per schedule  Matteo Sweeney MD, MPH  Lung Transplantation , Pulmonary and Critical care Medicine [facility]     [Time Spent: ___ minutes] : I have spent [unfilled] minutes of time on the encounter which excludes teaching and separately reported services.

## 2025-05-08 NOTE — HISTORY OF PRESENT ILLNESS
[TextBox_4] : PRE Transplant History: 31 year old male PVOD associated with right heart failure who presented with acute hypoxic respiratory failure and cardiogenic shock. Admitted to CCU and treated with Shankar and milrinone. Initiated on Epoprostenol  CT Chest showed centrilobular ground-glass nodules, interlobular septal thickening, and mediastinal lymphadenopathy. Course complicated by pneumothorax following placement of PAC. He underwent bronchoscopy and EBUS on 12/10/24. Lymph node negative for malignancy.   Following stabilization in the ICU patient was transferred to RCU 24. Tested positive for RSV 24 s/p course of Ribavirin. Evaluated by dental 24 for jaw pain and found to have inflamed gingiva around #17 distal and lingual, with generalized plaque accumulation and gross debris/plaque s/p irrigation and cleaning. No active infection noted. RRT on 24 in setting of difficulty with Flolan infusion running through PICC Line. Flolan was subsequently changed to R IJ but then became symptomatic with tachycardia, flushing and chest pain. Patient was transferred to MICU for further monitoring. Transferred back to RCU 24. Left anterior chest wall Pandya catheter placemed for Flolan infusion 24.  Listed:  Surgery: BILATERAL LUNG TRANSPLANT 25   Donor:  CMV + / EBV + / Toxo - , PHS risk Y/N Recipient: CMV +  / EBV +  / Toxo -  Induction: simluect 20mg x2,, medrol 1 g  Total Ischemic Time: RIGHT LUN" LEFT LUN" Extubated:25  Hospital Course: Bilateral lung transplant 25. Extubated on 25. Postoperative course notable for right main stem bronchus with necrotic material and persistent air leak requiring prolonged chest tube placement. Fluid cultures 25 grew penicillium and paecilomyces for which he is on posaconazole. Also developed an acute L IJ DVT treated with lose dose Eliquis. He also tested positive for COVID initially on 3/4/25 treated with a 5 day course of Remdesivir. He remained asymptomatic. Patient discharged home 3/17/25.  TELE-Visit 3/19/2025: Initial visit is remote given positive COVID. He remains asymptomatic. Feels somewhat tired after climbing stairs at home. Also notes some chest tightness around the chest, which was present during his hospital stay. He is taking his medications as instructed using his pill box, which he had on him during the visit. He is taking strict aspiration precautions, which is also being enforced by his family. He is staying with his brother and designated caretaker Jayson, who is also present for the visit. He is ambulating daily without issue. Patient has vitals available for review, which were checked at home. Reported SpO2 97 - 99% on room air. /75. T 97.4 F. Wt 129 lbs.  CLINIC 3/26/2025: Patient presents to clinic for routine follow up appointment post hospital discharge.  Recent COVID infection, last RVP negative, SPI improved, best to date, denies respiratory complications. Complained of sore throat/esophageal pain X 3 days. Thyroid assessed, appears normal, thyroid panel and DSA/Allosure drawn. Chest tube sutures removed, incisions healing well, no signs of infection noted. Medications reviewed with patient and brother, patient and brother verbalized understanding.   CLINIC 25: Slight decline in spirometry, not concerning as home sandy trend is stable, DSA/Allosure repeated for further surveillance. Patient complained of sore thrat X1-2 days, denies sick contact, RVP swab sent. Patient also complained of numbness and tingling in hands and fingers, will reassess next week and consider adding gabapentin.  IS/Oi medications are stable; no changes this week. Routine labs to be repeated on Monday  CLINIC 25: RVP 4/3 + enterovirus and COVID. Started on a prednisone taper starting at 40 mg daily. Has further decline in spirometry from 2.63 last week to 2.49 today. Allosure was checked 3/26/25 and was 0.06%. DSA 0%. Has mild sore throat. No fevers. No shortness of breath. Patient's brother accompanied him today. Patient reports compliance with medications. His brother states that he is trying to encourage more physical activity.  Readmitted 4/10 - 25 for concern for respiratory symptoms, COVID infection with worsening PFTs. CT chest negative, + enterovirus on RVP. Covid was negative. Transplant ID consulted. Completed empiric course of levaquin inpatient.  CLINIC 2025: Mr. Buck was seen and examined by me and the ACP on 20[facility] [address] [facility] [address] in a transplant office and I reviewed his data which was extensive the day before the scheduled visitation completely including lab data and radiographic information.  CLINIC 2025: CLINIC 2025: Patient clinically stable, spirometry improved, denies respiratory complications. Cellecpt increased to 500 mg BID. Posa decreased to 200mg as trough is 2.7. Metformin 500mg daily started by Dr. Claire on ; discontinued today as Metformin is not a preferred drug of choice for post-transplant patients. an appointment was facilitated with Dr. Toro on 25.  CLINIC 2025: Patient presents for routine follow up appointment. Reports feeling well. No respiratory complaints. Has intermittent feeling of chest tightness not associated with dyspnea. Also notes stiff legs in the middle of the night which improves during daytime. He reports compliance with medications.  CLINIC 2025: Spirometry improved from last. Denies respiratory complications. Complained of neuropathy around incision sites, incisions are healed, no signs of infection. Patient also endorses leg cramps and stiffness., increase hydration with electrolytes encouraged.  Patient states he has moved back to parent's house with his wife and son. Reports wife now helps him with medication and pill box.      DONOR CULTURES:  CHECK UNET POD 1,3,7,10,14 2025: respiratory cx: pseudomonas fluoresecens 25 blood cx: ngtd 25 R BAL: <10k staph aureus 25 L BAL: <10k staph aureus 25 urine cx: ngtd 25: bld cx: staph epi?? - I'm not seeing this on unet?  CULTURES: : OR: rare yeast, rare mold, + pseudomonas flurosecens : OR: candida parapsilosis, s/p  caspofungin : OR: rare penicillum  25 body fluid: rare candida, rare pseudomonas fluorescens 25 body fluid cx: rare candida parapsilosis, rare penicillium, rare paecilomyces 25: BAL: <10k growth 25 BAL: ngtd 25: NGTD 3/4/25: BAL + COVID (remdesivir 3/5 - 3/10) 3/7/25: RVP + COVID 3/13: RVP + COVID   TBBX: 1 MONTH: 3/4/25: A0Bx, C4D neg 3 MONTH: 2025 A0Bx GMS and C4d negative 6 MONTH: 2025 9 MONTH: 10/2025 12 MONTH: 2026   DSA: 1 WEEK: (high risk): 25: 0%, NO DSA 2 WEEK: (high risk): 25: cPRA 0% NO DSA >2000 Late 1 month SENT 3/4/25; no Dsa, CPRA0% Repeated 3/26: 0% 3 MONTH: 2025 0% 6 MONTH  9 MONTH  12 MONTH  ALLOSURE: Late 1 month: SENT 3/4/25: 0.19% Repeat 3/26: 0.06% Repeated 2025 3 MONTH: 2025 0.06% 6 MONTH  9 MONTH  12 MONTH   SPIROMETRY  inpatient	 FVC 2.1	    FEV1 1.82 2025 inpatient	 FVC 2.21    FEV1 2.08 2025 inpatient	 FVC 1.54    FEV1 1.16 2025 inpatient	 FVC 2.07    FEV1 2.05 3/3/2025	inpatient	 FVC 1.94    FEV1 1.77 3/10/2025 inpatient	 FVC 2.04    FEV1 1.85 3/13/2025 inpatient	 FVC 2.21    FEV1 2.06 3/17/2025 inpatient	 FVC 2.06    FEV1 1.94 3/26/2025 [facility]:   FVC 2.98   FEV1 2.92 2025 [facility]:  FVC 2.70   FEV1 2.63 2025 [facility]:  FVC 2.61   FEV1 2.49 2025 [facility]:  FVC 2.53   FEV1 2.40 2025 [facility]:  FVC 2.69   FEV1 2.58 2025 [facility]     FVC 2.65    FEV1 2.58 2025 [facility]     FVC 2.71    FEV1 2.66

## 2025-05-08 NOTE — HISTORY OF PRESENT ILLNESS
[TextBox_4] : PRE Transplant History: 31 year old male PVOD associated with right heart failure who presented with acute hypoxic respiratory failure and cardiogenic shock. Admitted to CCU and treated with Shankar and milrinone. Initiated on Epoprostenol  CT Chest showed centrilobular ground-glass nodules, interlobular septal thickening, and mediastinal lymphadenopathy. Course complicated by pneumothorax following placement of PAC. He underwent bronchoscopy and EBUS on 12/10/24. Lymph node negative for malignancy.   Following stabilization in the ICU patient was transferred to RCU 24. Tested positive for RSV 24 s/p course of Ribavirin. Evaluated by dental 24 for jaw pain and found to have inflamed gingiva around #17 distal and lingual, with generalized plaque accumulation and gross debris/plaque s/p irrigation and cleaning. No active infection noted. RRT on 24 in setting of difficulty with Flolan infusion running through PICC Line. Flolan was subsequently changed to R IJ but then became symptomatic with tachycardia, flushing and chest pain. Patient was transferred to MICU for further monitoring. Transferred back to RCU 24. Left anterior chest wall Pandya catheter placemed for Flolan infusion 24.  Listed:  Surgery: BILATERAL LUNG TRANSPLANT 25   Donor:  CMV + / EBV + / Toxo - , PHS risk Y/N Recipient: CMV +  / EBV +  / Toxo -  Induction: simluect 20mg x2,, medrol 1 g  Total Ischemic Time: RIGHT LUN" LEFT LUN" Extubated:25  Hospital Course: Bilateral lung transplant 25. Extubated on 25. Postoperative course notable for right main stem bronchus with necrotic material and persistent air leak requiring prolonged chest tube placement. Fluid cultures 25 grew penicillium and paecilomyces for which he is on posaconazole. Also developed an acute L IJ DVT treated with lose dose Eliquis. He also tested positive for COVID initially on 3/4/25 treated with a 5 day course of Remdesivir. He remained asymptomatic. Patient discharged home 3/17/25.  TELE-Visit 3/19/2025: Initial visit is remote given positive COVID. He remains asymptomatic. Feels somewhat tired after climbing stairs at home. Also notes some chest tightness around the chest, which was present during his hospital stay. He is taking his medications as instructed using his pill box, which he had on him during the visit. He is taking strict aspiration precautions, which is also being enforced by his family. He is staying with his brother and designated caretaker Jayson, who is also present for the visit. He is ambulating daily without issue. Patient has vitals available for review, which were checked at home. Reported SpO2 97 - 99% on room air. /75. T 97.4 F. Wt 129 lbs.  CLINIC 3/26/2025: Patient presents to clinic for routine follow up appointment post hospital discharge.  Recent COVID infection, last RVP negative, SPI improved, best to date, denies respiratory complications. Complained of sore throat/esophageal pain X 3 days. Thyroid assessed, appears normal, thyroid panel and DSA/Allosure drawn. Chest tube sutures removed, incisions healing well, no signs of infection noted. Medications reviewed with patient and brother, patient and brother verbalized understanding.   CLINIC 25: Slight decline in spirometry, not concerning as home sandy trend is stable, DSA/Allosure repeated for further surveillance. Patient complained of sore thrat X1-2 days, denies sick contact, RVP swab sent. Patient also complained of numbness and tingling in hands and fingers, will reassess next week and consider adding gabapentin.  IS/Oi medications are stable; no changes this week. Routine labs to be repeated on Monday  CLINIC 25: RVP 4/3 + enterovirus and COVID. Started on a prednisone taper starting at 40 mg daily. Has further decline in spirometry from 2.63 last week to 2.49 today. Allosure was checked 3/26/25 and was 0.06%. DSA 0%. Has mild sore throat. No fevers. No shortness of breath. Patient's brother accompanied him today. Patient reports compliance with medications. His brother states that he is trying to encourage more physical activity.  Readmitted 4/10 - 25 for concern for respiratory symptoms, COVID infection with worsening PFTs. CT chest negative, + enterovirus on RVP. Covid was negative. Transplant ID consulted. Completed empiric course of levaquin inpatient.  CLINIC 2025: Mr. Buck was seen and examined by me and the ACP on 20[facility] [address] in a transplant office and I reviewed his data which was extensive the day before the scheduled visitation completely including lab data and radiographic information.  CLINIC 2025: CLINIC 2025: Patient clinically stable, spirometry improved, denies respiratory complications. Cellecpt increased to 500 mg BID. Posa decreased to 200mg as trough is 2.7. Metformin 500mg daily started by Dr. Claire on ; discontinued today as Metformin is not a preferred drug of choice for post-transplant patients. an appointment was facilitated with Dr. Toro on 25.  CLINIC 2025: Patient presents for routine follow up appointment. Reports feeling well. No respiratory complaints. Has intermittent feeling of chest tightness not associated with dyspnea. Also notes stiff legs in the middle of the night which improves during daytime. He reports compliance with medications.  CLINIC 2025: Spirometry improved from last. Denies respiratory complications. Complained of neuropathy around incision sites, incisions are healed, no signs of infection. Patient also endorses leg cramps and stiffness., increase hydration with electrolytes encouraged.  Patient states he has moved back to parent's house with his wife and son. Reports wife now helps him with medication and pill box.      DONOR CULTURES:  CHECK UNET POD 1,3,7,10,14 2025: respiratory cx: pseudomonas fluoresecens 25 blood cx: ngtd 25 R BAL: <10k staph aureus 25 L BAL: <10k staph aureus 25 urine cx: ngtd 25: bld cx: staph epi?? - I'm not seeing this on unet?  CULTURES: : OR: rare yeast, rare mold, + pseudomonas flurosecens : OR: candida parapsilosis, s/p  caspofungin : OR: rare penicillum  25 body fluid: rare candida, rare pseudomonas fluorescens 25 body fluid cx: rare candida parapsilosis, rare penicillium, rare paecilomyces 25: BAL: <10k growth 25 BAL: ngtd 25: NGTD 3/4/25: BAL + COVID (remdesivir 3/5 - 3/10) 3/7/25: RVP + COVID 3/13: RVP + COVID   TBBX: 1 MONTH: 3/4/25: A0Bx, C4D neg 3 MONTH: 2025 A0Bx GMS and C4d negative 6 MONTH: 2025 9 MONTH: 10/2025 12 MONTH: 2026   DSA: 1 WEEK: (high risk): 25: 0%, NO DSA 2 WEEK: (high risk): 25: cPRA 0% NO DSA >2000 Late 1 month SENT 3/4/25; no Dsa, CPRA0% Repeated 3/26: 0% 3 MONTH: 2025 0% 6 MONTH  9 MONTH  12 MONTH  ALLOSURE: Late 1 month: SENT 3/4/25: 0.19% Repeat 3/26: 0.06% Repeated 2025 3 MONTH: 2025 0.06% 6 MONTH  9 MONTH  12 MONTH   SPIROMETRY  inpatient	 FVC 2.1	    FEV1 1.82 2025 inpatient	 FVC 2.21    FEV1 2.08 2025 inpatient	 FVC 1.54    FEV1 1.16 2025 inpatient	 FVC 2.07    FEV1 2.05 3/3/2025	inpatient	 FVC 1.94    FEV1 1.77 3/10/2025 inpatient	 FVC 2.04    FEV1 1.85 3/13/2025 inpatient	 FVC 2.21    FEV1 2.06 3/17/2025 inpatient	 FVC 2.06    FEV1 1.94 3/26/2025 [facility]:   FVC 2.98   FEV1 2.92 2025 [facility]:  FVC 2.70   FEV1 2.63 2025 [facility]:  FVC 2.61   FEV1 2.49 2025 [facility]:  FVC 2.53   FEV1 2.40 2025 [facility]:  FVC 2.69   FEV1 2.58 2025 [facility]     FVC 2.65    FEV1 2.58 2025 [facility]     FVC 2.71    FEV1 2.66

## 2025-05-08 NOTE — END OF VISIT
[FreeTextEntry3] : Seen and examined patient with NP/PA/ Fellow physician.  Independently verified the review of systems, physical examination, labs, radiological imaging. Also discussed with consultants to formulate eventual assessment and plan.    Assessment and plan:  Post lung transplantation Immunosuppression as above No respiratory symptoms.  Reports chest wall pain laterally on the right side with tingling Not checking home spirometry regularly -counseled ReCheck RUE dopplers-Currently on Eliquis Moved out of brother's house into parents house with wife and child.  Wife managing transplant medications. Advised to bring her in clinic  Reports leg cramps: Watch for hypokalemia hypocalcemia and hypomagnesemia.  TSH ws normal.  RTC per schedule  Matteo Sweeney MD, MPH  Lung Transplantation , Pulmonary and Critical care Medicine [facility]     [Time Spent: ___ minutes] : I have spent [unfilled] minutes of time on the encounter which excludes teaching and separately reported services.

## 2025-05-16 NOTE — ASSESSMENT
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 S/P Bilateral Lung Transplant 1/31/2025   #IS GOAL 10-12  Tacrolimus 0.5 mg BID, 0.5 mg QD alternating (Level 5/14/25: 8.3) --> increased to 0.5mg BID 5/16/25 - Prednisone 17.5 mg for wound healing - Cellcept 500 mg BID--> increased to 750mg BID 5/16/25  #OI - Valcyte 450mg BID - Bactrim SS daily - Posaconazole 200 mg daily  #LUNG TXP - Culture 2/1/25 rare penicillium - Posa 200mg BID - Transplant ID appointment pending   #GI Hx of GERD - Protonix 40 mg daily  #HEM Left IJ DVT Plan: discontinue after 3 months (5/13/25) - Eliquis 2.5 mg BID, started 2/13/25   #ID Latent TB therapy - Isoniazid 300 mg daily - Pyridoxine 50 mg daily  #ENDO DMT2 - Januvia 100mg daily, started by endo 4/30/25    FOLLOW UP - Routine weekly labs 5/19/2025 - Mendez reviewed, declined - DSA/Allosure drawn today - 6-Month bronch 7/2025 - Sooner appointment with Dr. Garcia  - UE duplex to be repeated  - Continue to follow up with Transplant ID. - Continue to follow up with endocrinology    RTC in 1 week with Dunbar & CXR  All questions answered, used teach back method, patient verbalized understanding.  Above discussed with Dr. Brito

## 2025-05-16 NOTE — HISTORY OF PRESENT ILLNESS
[TextBox_4] : PRE Transplant History: 31 year old male PVOD associated with right heart failure who presented with acute hypoxic respiratory failure and cardiogenic shock. Admitted to CCU and treated with Shankar and milrinone. Initiated on Epoprostenol  CT Chest showed centrilobular ground-glass nodules, interlobular septal thickening, and mediastinal lymphadenopathy. Course complicated by pneumothorax following placement of PAC. He underwent bronchoscopy and EBUS on 12/10/24. Lymph node negative for malignancy.   Following stabilization in the ICU patient was transferred to RCU 24. Tested positive for RSV 24 s/p course of Ribavirin. Evaluated by dental 24 for jaw pain and found to have inflamed gingiva around #17 distal and lingual, with generalized plaque accumulation and gross debris/plaque s/p irrigation and cleaning. No active infection noted. RRT on 24 in setting of difficulty with Flolan infusion running through PICC Line. Flolan was subsequently changed to R IJ but then became symptomatic with tachycardia, flushing and chest pain. Patient was transferred to MICU for further monitoring. Transferred back to RCU 24. Left anterior chest wall Pandya catheter placemed for Flolan infusion 24.  Listed:  Surgery: BILATERAL LUNG TRANSPLANT 25   Donor:  CMV + / EBV + / Toxo - , PHS risk Y/N Recipient: CMV +  / EBV +  / Toxo -  Induction: simluect 20mg x2,, medrol 1 g  Total Ischemic Time: RIGHT LUN" LEFT LUN" Extubated:25  Hospital Course: Bilateral lung transplant 25. Extubated on 25. Postoperative course notable for right main stem bronchus with necrotic material and persistent air leak requiring prolonged chest tube placement. Fluid cultures 25 grew penicillium and paecilomyces for which he is on posaconazole. Also developed an acute L IJ DVT treated with lose dose Eliquis. He also tested positive for COVID initially on 3/4/25 treated with a 5 day course of Remdesivir. He remained asymptomatic. Patient discharged home 3/17/25.  TELE-Visit 3/19/2025: Initial visit is remote given positive COVID. He remains asymptomatic. Feels somewhat tired after climbing stairs at home. Also notes some chest tightness around the chest, which was present during his hospital stay. He is taking his medications as instructed using his pill box, which he had on him during the visit. He is taking strict aspiration precautions, which is also being enforced by his family. He is staying with his brother and designated caretaker Jayson, who is also present for the visit. He is ambulating daily without issue. Patient has vitals available for review, which were checked at home. Reported SpO2 97 - 99% on room air. /75. T 97.4 F. Wt 129 lbs.  CLINIC 3/26/2025: Patient presents to clinic for routine follow up appointment post hospital discharge.  Recent COVID infection, last RVP negative, SPI improved, best to date, denies respiratory complications. Complained of sore throat/esophageal pain X 3 days. Thyroid assessed, appears normal, thyroid panel and DSA/Allosure drawn. Chest tube sutures removed, incisions healing well, no signs of infection noted. Medications reviewed with patient and brother, patient and brother verbalized understanding.   CLINIC 25: Slight decline in spirometry, not concerning as home sandy trend is stable, DSA/Allosure repeated for further surveillance. Patient complained of sore thrat X1-2 days, denies sick contact, RVP swab sent. Patient also complained of numbness and tingling in hands and fingers, will reassess next week and consider adding gabapentin.  IS/Oi medications are stable; no changes this week. Routine labs to be repeated on Monday  CLINIC 25: RVP 4/3 + enterovirus and COVID. Started on a prednisone taper starting at 40 mg daily. Has further decline in spirometry from 2.63 last week to 2.49 today. Allosure was checked 3/26/25 and was 0.06%. DSA 0%. Has mild sore throat. No fevers. No shortness of breath. Patient's brother accompanied him today. Patient reports compliance with medications. His brother states that he is trying to encourage more physical activity.  Readmitted 4/10 - 25 for concern for respiratory symptoms, COVID infection with worsening PFTs. CT chest negative, + enterovirus on RVP. Covid was negative. Transplant ID consulted. Completed empiric course of levaquin inpatient.  CLINIC 2025: Mr. Buck was seen and examined by me and the ACP on 2025 at [address] in a transplant office and I reviewed his data which was extensive the day before the scheduled visitation completely including lab data and radiographic information.  CLINIC 2025: CLINIC 2025: Patient clinically stable, spirometry improved, denies respiratory complications. Cellecpt increased to 500 mg BID. Posa decreased to 200mg as trough is 2.7. Metformin 500mg daily started by Dr. Claire on ; discontinued today as Metformin is not a preferred drug of choice for post-transplant patients. an appointment was facilitated with Dr. Toro on 25.  CLINIC 2025: Patient presents for routine follow up appointment. Reports feeling well. No respiratory complaints. Has intermittent feeling of chest tightness not associated with dyspnea. Also notes stiff legs in the middle of the night which improves during daytime. He reports compliance with medications.  CLINIC 2025: Patient presents for routine follow up appointment. Spirometry improved from last. Denies respiratory complications. Complained of neuropathy around incision sites, incisions are healed, no signs of infection. Patient also endorses leg cramps and stiffness., increase hydration with electrolytes encouraged. Patient states he has moved back to parent's house with his wife and son. Reports wife now helps him with medication and pill box.  CLINIC 2025: Spirometry declined. Stable respiratory status. Denies SOB or cough. Fell yesterday with associated head strike. Has bruise on scalp. Painful to touch. Also has mild right knee pain. Denies LOC. Denies headache.  Sent patient to the ER for CT of head.  DSA/Allosure drawn and sent today for declined Spirometry. Patient to follow up with Dr. Garcia to evaluate the need of Eliquis. Tacrolimus increased to 0.5mg BID and Cellcept increased to 750mg BID. Medication changes reviewed with patient, med-action updated and provided.     DONOR CULTURES:  CHECK UNET POD 1,3,7,10,14 2025: respiratory cx: pseudomonas fluoresecens 25 blood cx: ngtd 25 R BAL: <10k staph aureus 25 L BAL: <10k staph aureus 25 urine cx: ngtd 25: bld cx: staph epi?? - I'm not seeing this on unet?  CULTURES: : OR: rare yeast, rare mold, + pseudomonas flurosecens : OR: candida parapsilosis, s/p  caspofungin : OR: rare penicillum  25 body fluid: rare candida, rare pseudomonas fluorescens 25 body fluid cx: rare candida parapsilosis, rare penicillium, rare paecilomyces 25: BAL: <10k growth 25 BAL: ngtd 25: NGTD 3/4/25: BAL + COVID (remdesivir 3/5 - 3/10) 3/7/25: RVP + COVID 3/13: RVP + COVID  2025: Rhinovirus->monitor/NGTD   TBBX: 1 MONTH: 3/4/25: A0Bx, C4D neg 3 MONTH: 2025 A0Bx GMS and C4d negative 6 MONTH: 2025 9 MONTH: 10/2025 12 MONTH: 2026   DSA: 1 WEEK: (high risk): 25: 0%, NO DSA 2 WEEK: (high risk): 25: cPRA 0% NO DSA >2000 Late 1 month SENT 3/4/25; no Dsa, CPRA0% Repeated 3/26: 0% 3 MONTH: 2025 0% Repeat 25 pending 6 MONTH  9 MONTH  12 MONTH  ALLOSURE: Late 1 month: SENT 3/4/25: 0.19% Repeat 3/26: 0.06% Repeated 2025 3 MONTH: 2025 0.06% Repeat: Decline FEV1 2025: pending 6 MONTH  9 MONTH  12 MONTH   SPIROMETRY  inpatient	 FVC 2.1	    FEV1 1.82 2025 inpatient	 FVC 2.21    FEV1 2.08 2025 inpatient	 FVC 1.54    FEV1 1.16 2025 inpatient	 FVC 2.07    FEV1 2.05 3/3/2025	inpatient	 FVC 1.94    FEV1 1.77 3/10/2025 inpatient	 FVC 2.04    FEV1 1.85 3/13/2025 inpatient	 FVC 2.21    FEV1 2.06 3/17/2025 inpatient	 FVC 2.06    FEV1 1.94 3/26/2025 [facility]:   FVC 2.98   FEV1 2.92 2025 [facility]:  FVC 2.70   FEV1 2.63 2025 [facility]:  FVC 2.61   FEV1 2.49 2025 [facility]:  FVC 2.53   FEV1 2.40 2025 [facility]:  FVC 2.69   FEV1 2.58 2025 [facility]     FVC 2.65    FEV1 2.58 2025 [facility]:  FVC 2.71    FEV1 2.66 2025 [facility]:  FVC 2.32    FEV1 2.25

## 2025-05-16 NOTE — PHYSICAL EXAM
[No Acute Distress] : no acute distress [Normal Rate/Rhythm] : normal rate/rhythm [Normal S1, S2] : normal s1, s2 [No Murmurs] : no murmurs [No Resp Distress] : no resp distress [Clear to Auscultation Bilaterally] : clear to auscultation bilaterally [Benign] : benign [No Clubbing] : no clubbing [No Edema] : no edema [Normal Color/ Pigmentation] : normal color/ pigmentation [No Focal Deficits] : no focal deficits [Oriented x3] : oriented x3 [Normal Oropharynx] : normal oropharynx [Normal Appearance] : normal appearance [No Neck Mass] : no neck mass [No Abnormalities] : no abnormalities [Normal Gait] : normal gait [TextBox_11] : Small bruise right scalp.

## 2025-05-16 NOTE — HISTORY OF PRESENT ILLNESS
[TextBox_4] : PRE Transplant History: 31 year old male PVOD associated with right heart failure who presented with acute hypoxic respiratory failure and cardiogenic shock. Admitted to CCU and treated with Shankar and milrinone. Initiated on Epoprostenol  CT Chest showed centrilobular ground-glass nodules, interlobular septal thickening, and mediastinal lymphadenopathy. Course complicated by pneumothorax following placement of PAC. He underwent bronchoscopy and EBUS on 12/10/24. Lymph node negative for malignancy.   Following stabilization in the ICU patient was transferred to RCU 24. Tested positive for RSV 24 s/p course of Ribavirin. Evaluated by dental 24 for jaw pain and found to have inflamed gingiva around #17 distal and lingual, with generalized plaque accumulation and gross debris/plaque s/p irrigation and cleaning. No active infection noted. RRT on 24 in setting of difficulty with Flolan infusion running through PICC Line. Flolan was subsequently changed to R IJ but then became symptomatic with tachycardia, flushing and chest pain. Patient was transferred to MICU for further monitoring. Transferred back to RCU 24. Left anterior chest wall Pandya catheter placemed for Flolan infusion 24.  Listed:  Surgery: BILATERAL LUNG TRANSPLANT 25   Donor:  CMV + / EBV + / Toxo - , PHS risk Y/N Recipient: CMV +  / EBV +  / Toxo -  Induction: simluect 20mg x2,, medrol 1 g  Total Ischemic Time: RIGHT LUN" LEFT LUN" Extubated:25  Hospital Course: Bilateral lung transplant 25. Extubated on 25. Postoperative course notable for right main stem bronchus with necrotic material and persistent air leak requiring prolonged chest tube placement. Fluid cultures 25 grew penicillium and paecilomyces for which he is on posaconazole. Also developed an acute L IJ DVT treated with lose dose Eliquis. He also tested positive for COVID initially on 3/4/25 treated with a 5 day course of Remdesivir. He remained asymptomatic. Patient discharged home 3/17/25.  TELE-Visit 3/19/2025: Initial visit is remote given positive COVID. He remains asymptomatic. Feels somewhat tired after climbing stairs at home. Also notes some chest tightness around the chest, which was present during his hospital stay. He is taking his medications as instructed using his pill box, which he had on him during the visit. He is taking strict aspiration precautions, which is also being enforced by his family. He is staying with his brother and designated caretaker Jayson, who is also present for the visit. He is ambulating daily without issue. Patient has vitals available for review, which were checked at home. Reported SpO2 97 - 99% on room air. /75. T 97.4 F. Wt 129 lbs.  CLINIC 3/26/2025: Patient presents to clinic for routine follow up appointment post hospital discharge.  Recent COVID infection, last RVP negative, SPI improved, best to date, denies respiratory complications. Complained of sore throat/esophageal pain X 3 days. Thyroid assessed, appears normal, thyroid panel and DSA/Allosure drawn. Chest tube sutures removed, incisions healing well, no signs of infection noted. Medications reviewed with patient and brother, patient and brother verbalized understanding.   CLINIC 25: Slight decline in spirometry, not concerning as home sandy trend is stable, DSA/Allosure repeated for further surveillance. Patient complained of sore thrat X1-2 days, denies sick contact, RVP swab sent. Patient also complained of numbness and tingling in hands and fingers, will reassess next week and consider adding gabapentin.  IS/Oi medications are stable; no changes this week. Routine labs to be repeated on Monday  CLINIC 25: RVP 4/3 + enterovirus and COVID. Started on a prednisone taper starting at 40 mg daily. Has further decline in spirometry from 2.63 last week to 2.49 today. Allosure was checked 3/26/25 and was 0.06%. DSA 0%. Has mild sore throat. No fevers. No shortness of breath. Patient's brother accompanied him today. Patient reports compliance with medications. His brother states that he is trying to encourage more physical activity.  Readmitted 4/10 - 25 for concern for respiratory symptoms, COVID infection with worsening PFTs. CT chest negative, + enterovirus on RVP. Covid was negative. Transplant ID consulted. Completed empiric course of levaquin inpatient.  CLINIC 2025: Mr. Buck was seen and examined by me and the ACP on 2025 at [address] in a transplant office and I reviewed his data which was extensive the day before the scheduled visitation completely including lab data and radiographic information.  CLINIC 2025: CLINIC 2025: Patient clinically stable, spirometry improved, denies respiratory complications. Cellecpt increased to 500 mg BID. Posa decreased to 200mg as trough is 2.7. Metformin 500mg daily started by Dr. Claire on ; discontinued today as Metformin is not a preferred drug of choice for post-transplant patients. an appointment was facilitated with Dr. Toro on 25.  CLINIC 2025: Patient presents for routine follow up appointment. Reports feeling well. No respiratory complaints. Has intermittent feeling of chest tightness not associated with dyspnea. Also notes stiff legs in the middle of the night which improves during daytime. He reports compliance with medications.  CLINIC 2025: Patient presents for routine follow up appointment. Spirometry improved from last. Denies respiratory complications. Complained of neuropathy around incision sites, incisions are healed, no signs of infection. Patient also endorses leg cramps and stiffness., increase hydration with electrolytes encouraged. Patient states he has moved back to parent's house with his wife and son. Reports wife now helps him with medication and pill box.  CLINIC 2025: Spirometry declined. Stable respiratory status. Denies SOB or cough. Fell yesterday with associated head strike. Has bruise on scalp. Painful to touch. Also has mild right knee pain. Denies LOC. Denies headache.  Sent patient to the ER for CT of head.  DSA/Allosure drawn and sent today for declined Spirometry. Patient to follow up with Dr. Garcia to evaluate the need of Eliquis. Tacrolimus increased to 0.5mg BID and Cellcept increased to 750mg BID. Medication changes reviewed with patient, med-action updated and provided.     DONOR CULTURES:  CHECK UNET POD 1,3,7,10,14 2025: respiratory cx: pseudomonas fluoresecens 25 blood cx: ngtd 25 R BAL: <10k staph aureus 25 L BAL: <10k staph aureus 25 urine cx: ngtd 25: bld cx: staph epi?? - I'm not seeing this on unet?  CULTURES: : OR: rare yeast, rare mold, + pseudomonas flurosecens : OR: candida parapsilosis, s/p  caspofungin : OR: rare penicillum  25 body fluid: rare candida, rare pseudomonas fluorescens 25 body fluid cx: rare candida parapsilosis, rare penicillium, rare paecilomyces 25: BAL: <10k growth 25 BAL: ngtd 25: NGTD 3/4/25: BAL + COVID (remdesivir 3/5 - 3/10) 3/7/25: RVP + COVID 3/13: RVP + COVID  2025: Rhinovirus->monitor/NGTD   TBBX: 1 MONTH: 3/4/25: A0Bx, C4D neg 3 MONTH: 2025 A0Bx GMS and C4d negative 6 MONTH: 2025 9 MONTH: 10/2025 12 MONTH: 2026   DSA: 1 WEEK: (high risk): 25: 0%, NO DSA 2 WEEK: (high risk): 25: cPRA 0% NO DSA >2000 Late 1 month SENT 3/4/25; no Dsa, CPRA0% Repeated 3/26: 0% 3 MONTH: 2025 0% Repeat 25 pending 6 MONTH  9 MONTH  12 MONTH  ALLOSURE: Late 1 month: SENT 3/4/25: 0.19% Repeat 3/26: 0.06% Repeated 2025 3 MONTH: 2025 0.06% Repeat: Decline FEV1 2025: pending 6 MONTH  9 MONTH  12 MONTH   SPIROMETRY  inpatient	 FVC 2.1	    FEV1 1.82 2025 inpatient	 FVC 2.21    FEV1 2.08 2025 inpatient	 FVC 1.54    FEV1 1.16 2025 inpatient	 FVC 2.07    FEV1 2.05 3/3/2025	inpatient	 FVC 1.94    FEV1 1.77 3/10/2025 inpatient	 FVC 2.04    FEV1 1.85 3/13/2025 inpatient	 FVC 2.21    FEV1 2.06 3/17/2025 inpatient	 FVC 2.06    FEV1 1.94 3/26/2025 [facility]:   FVC 2.98   FEV1 2.92 2025 [facility]:  FVC 2.70   FEV1 2.63 2025 [facility]:  FVC 2.61   FEV1 2.49 2025 [facility]:  FVC 2.53   FEV1 2.40 2025 [facility]:  FVC 2.69   FEV1 2.58 2025 [facility]     FVC 2.65    FEV1 2.58 2025 [facility]:  FVC 2.71    FEV1 2.66 2025 [facility]:  FVC 2.32    FEV1 2.25

## 2025-05-16 NOTE — ASSESSMENT
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 S/P Bilateral Lung Transplant 1/31/2025   #IS GOAL 10-12  Tacrolimus 0.5 mg BID, 0.5 mg QD alternating (Level 5/14/25: 8.3) --> increased to 0.5mg BID 5/16/25 - Prednisone 17.5 mg for wound healing - Cellcept 500 mg BID--> increased to 750mg BID 5/16/25  #OI - Valcyte 450mg BID - Bactrim SS daily - Posaconazole 200 mg daily  #LUNG TXP - Culture 2/1/25 rare penicillium - Posa 200mg BID - Transplant ID appointment pending   #GI Hx of GERD - Protonix 40 mg daily  #HEM Left IJ DVT Plan: discontinue after 3 months (5/13/25) - Eliquis 2.5 mg BID, started 2/13/25   #ID Latent TB therapy - Isoniazid 300 mg daily - Pyridoxine 50 mg daily  #ENDO DMT2 - Januvia 100mg daily, started by endo 4/30/25    FOLLOW UP - Routine weekly labs 5/19/2025 - Mendez reviewed, declined - DSA/Allosure drawn today - 6-Month bronch 7/2025 - Sooner appointment with Dr. Garcia  - UE duplex to be repeated  - Continue to follow up with Transplant ID. - Continue to follow up with endocrinology    RTC in 1 week with [place] & CXR  All questions answered, used teach back method, patient verbalized understanding.  Above discussed with Dr. Brito

## 2025-05-16 NOTE — END OF VISIT
[Time Spent: ___ minutes] : I have spent [unfilled] minutes of time on the encounter which excludes teaching and separately reported services. [FreeTextEntry3] : 30 year old male presented initially with cardiogenic shock and acute on chronic RH failure in setting of severe Group 1 PAH / PVOD s/p bilateral LTx on 1/31/2025.  Clinically stable, on room air Spirometry: FEV1 decreased from prior. 2.25 L. Last FEV1 2.66.  Home spirometry with FEV1 2.6 L (5/12/25) and 2.49 L (5/13/25). PE: CTAB. No LE edema. Small bruise right scalp from fall. Labs 5/12/25 reviewed: Tacrolimus 7.3. Immunosuppression: Tacrolimus increased to 1.5 / 1 mg. Cellcept increased from 500 to 750 mg BID. Prednisone 17.5 mg daily TBBX 4/17/25 A0Bx, negative C4d DSA 4/16/25 - negative Allosure 04/16/25 - 0.06% OI PPX: Bactrim, Valcyte, Posaconazole (BAL 2/1 growing penicillium) LTBI: INH/B6. Transplant ID follow up L IJ Clot: Last UE/LE Duplex negative 3/12/25. Eliquis 2.5 mg BID. Stop after 3 months (5/2025). Needs repeat Duplex. GERD: Aspiration precautions.  Send to ER for CTH given recent headstrike while on Eliquis

## 2025-05-23 NOTE — PHYSICAL EXAM
[No Acute Distress] : no acute distress [Normal Oropharynx] : normal oropharynx [Normal Appearance] : normal appearance [No Neck Mass] : no neck mass [Normal Rate/Rhythm] : normal rate/rhythm [No Resp Distress] : no resp distress [Clear to Auscultation Bilaterally] : clear to auscultation bilaterally [No Abnormalities] : no abnormalities [Benign] : benign [Normal Gait] : normal gait [No Edema] : no edema [Normal Color/ Pigmentation] : normal color/ pigmentation [No Focal Deficits] : no focal deficits [Oriented x3] : oriented x3 [Normal Affect] : normal affect

## 2025-05-23 NOTE — HISTORY OF PRESENT ILLNESS
[TextBox_4] : PRE Transplant History: 31 year old male PVOD associated with right heart failure who presented with acute hypoxic respiratory failure and cardiogenic shock. Admitted to CCU and treated with Shankar and milrinone. Initiated on Epoprostenol  CT Chest showed centrilobular ground-glass nodules, interlobular septal thickening, and mediastinal lymphadenopathy. Course complicated by pneumothorax following placement of PAC. He underwent bronchoscopy and EBUS on 12/10/24. Lymph node negative for malignancy.   Following stabilization in the ICU patient was transferred to RCU 24. Tested positive for RSV 24 s/p course of Ribavirin. Evaluated by dental 24 for jaw pain and found to have inflamed gingiva around #17 distal and lingual, with generalized plaque accumulation and gross debris/plaque s/p irrigation and cleaning. No active infection noted. RRT on 24 in setting of difficulty with Flolan infusion running through PICC Line. Flolan was subsequently changed to R IJ but then became symptomatic with tachycardia, flushing and chest pain. Patient was transferred to MICU for further monitoring. Transferred back to RCU 24. Left anterior chest wall Pandya catheter placemed for Flolan infusion 24.  Listed:  Surgery: BILATERAL LUNG TRANSPLANT 25   Donor:  CMV + / EBV + / Toxo - , PHS risk Y/N Recipient: CMV +  / EBV +  / Toxo -  Induction: simluect 20mg x2,, medrol 1 g  Total Ischemic Time: RIGHT LUN" LEFT LUN" Extubated:25  Hospital Course: Bilateral lung transplant 25. Extubated on 25. Postoperative course notable for right main stem bronchus with necrotic material and persistent air leak requiring prolonged chest tube placement. Fluid cultures 25 grew penicillium and paecilomyces for which he is on posaconazole. Also developed an acute L IJ DVT treated with lose dose Eliquis. He also tested positive for COVID initially on 3/4/25 treated with a 5 day course of Remdesivir. He remained asymptomatic. Patient discharged home 3/17/25.  TELE-Visit 3/19/2025: Initial visit is remote given positive COVID. He remains asymptomatic. Feels somewhat tired after climbing stairs at home. Also notes some chest tightness around the chest, which was present during his hospital stay. He is taking his medications as instructed using his pill box, which he had on him during the visit. He is taking strict aspiration precautions, which is also being enforced by his family. He is staying with his brother and designated caretaker Jayson, who is also present for the visit. He is ambulating daily without issue. Patient has vitals available for review, which were checked at home. Reported SpO2 97 - 99% on room air. /75. T 97.4 F. Wt 129 lbs.  CLINIC 3/26/2025: Patient presents to clinic for routine follow up appointment post hospital discharge.  Recent COVID infection, last RVP negative, SPI improved, best to date, denies respiratory complications. Complained of sore throat/esophageal pain X 3 days. Thyroid assessed, appears normal, thyroid panel and DSA/Allosure drawn. Chest tube sutures removed, incisions healing well, no signs of infection noted. Medications reviewed with patient and brother, patient and brother verbalized understanding.   CLINIC 25: Slight decline in spirometry, not concerning as home sandy trend is stable, DSA/Allosure repeated for further surveillance. Patient complained of sore thrat X1-2 days, denies sick contact, RVP swab sent. Patient also complained of numbness and tingling in hands and fingers, will reassess next week and consider adding gabapentin.  IS/Oi medications are stable; no changes this week. Routine labs to be repeated on Monday  CLINIC 25: RVP 4/3 + enterovirus and COVID. Started on a prednisone taper starting at 40 mg daily. Has further decline in spirometry from 2.63 last week to 2.49 today. Allosure was checked 3/26/25 and was 0.06%. DSA 0%. Has mild sore throat. No fevers. No shortness of breath. Patient's brother accompanied him today. Patient reports compliance with medications. His brother states that he is trying to encourage more physical activity.  Readmitted 4/10 - 25 for concern for respiratory symptoms, COVID infection with worsening PFTs. CT chest negative, + enterovirus on RVP. Covid was negative. Transplant ID consulted. Completed empiric course of levaquin inpatient.  CLINIC 2025: Mr. Buck was seen and examined by me and the ACP on 2025 at [address] in a transplant office and I reviewed his data which was extensive the day before the scheduled visitation completely including lab data and radiographic information.  CLINIC 2025: CLINIC 2025: Patient clinically stable, spirometry improved, denies respiratory complications. Cellecpt increased to 500 mg BID. Posa decreased to 200mg as trough is 2.7. Metformin 500mg daily started by Dr. Claire on ; discontinued today as Metformin is not a preferred drug of choice for post-transplant patients. an appointment was facilitated with Dr. Toro on 25.  CLINIC 2025: Patient presents for routine follow up appointment. Reports feeling well. No respiratory complaints. Has intermittent feeling of chest tightness not associated with dyspnea. Also notes stiff legs in the middle of the night which improves during daytime. He reports compliance with medications.  CLINIC 2025: Patient presents for routine follow up appointment. Spirometry improved from last. Denies respiratory complications. Complained of neuropathy around incision sites, incisions are healed, no signs of infection. Patient also endorses leg cramps and stiffness., increase hydration with electrolytes encouraged. Patient states he has moved back to parent's house with his wife and son. Reports wife now helps him with medication and pill box.  CLINIC 2025: Spirometry declined. Stable respiratory status. Denies SOB or cough. Fell yesterday with associated head strike. Has bruise on scalp. Painful to touch. Also has mild right knee pain. Denies LOC. Denies headache.  Sent patient to the ER for CT of head.  DSA/Allosure drawn and sent today for declined Spirometry. Patient to follow up with Dr. Garcia to evaluate the need of Eliquis. Tacrolimus increased to 0.5mg BID and Cellcept increased to 750mg BID. Medication changes reviewed with patient, med-action updated and provided.    CLINIC 25: Spirometry improved, denies respiratory complications, reports doing well. Patient stated having right leg and right-hand cramps and stiffness, recommended to increase hydration. Pending appointment for Duplex to re-evaluate Left IJ DVT and possibly discontinuing Eliquis.    DONOR CULTURES:  CHECK UNET POD 1,3,7,10,14 2025: respiratory cx: pseudomonas fluoresecens 25 blood cx: ngtd 25 R BAL: <10k staph aureus 25 L BAL: <10k staph aureus 25 urine cx: ngtd 25: bld cx: staph epi?? - I'm not seeing this on unet?  CULTURES: : OR: rare yeast, rare mold, + pseudomonas flurosecens : OR: candida parapsilosis, s/p  caspofungin : OR: rare penicillum  25 body fluid: rare candida, rare pseudomonas fluorescens 25 body fluid cx: rare candida parapsilosis, rare penicillium, rare paecilomyces 25: BAL: <10k growth 25 BAL: ngtd 25: NGTD 3/4/25: BAL + COVID (remdesivir 3/5 - 3/10) 3/7/25: RVP + COVID 3/13: RVP + COVID  2025: Rhinovirus->monitor/NGTD   TBBX: 1 MONTH: 3/4/25: A0Bx, C4D neg 3 MONTH: 2025 A0Bx GMS and C4d negative 6 MONTH: 2025 9 MONTH: 10/2025 12 MONTH: 2026   DSA: 1 WEEK: (high risk): 25: 0%, NO DSA 2 WEEK: (high risk): 25: cPRA 0% NO DSA >2000 Late 1 month SENT 3/4/25; no Dsa, CPRA0% Repeated 3/26: 0% 3 MONTH: 2025 0% Repeat 25 0% 6 MONTH  9 MONTH  12 MONTH  ALLOSURE: Late 1 month: SENT 3/4/25: 0.19% Repeat 3/26: 0.06% Repeated 2025 3 MONTH: 2025 0.06% Repeat: Decline FEV1 2025: 0.08% 6 MONTH  9 MONTH  12 MONTH   SPIROMETRY  inpatient	 FVC 2.1	    FEV1 1.82 2025 inpatient	 FVC 2.21    FEV1 2.08 2025 inpatient	 FVC 1.54    FEV1 1.16 2025 inpatient	 FVC 2.07    FEV1 2.05 3/3/2025	inpatient	 FVC 1.94    FEV1 1.77 3/10/2025 inpatient	 FVC 2.04    FEV1 1.85 3/13/2025 inpatient	 FVC 2.21    FEV1 2.06 3/17/2025 inpatient	 FVC 2.06    FEV1 1.94 3/26/2025 [facility]:   FVC 2.98   FEV1 2.92 2025 [facility]:  FVC 2.70   FEV1 2.63 2025 [facility]:  FVC 2.61   FEV1 2.49 2025 [facility]:  FVC 2.53   FEV1 2.40 2025 [facility]:  FVC 2.69   FEV1 2.58 2025 [facility]     FVC 2.65    FEV1 2.58 2025 [facility]:  FVC 2.71    FEV1 2.66 2025 [facility]:  FVC 2.32    FEV1 2.25 2025 [facility]: FVC 2.87    FEV1 2.73

## 2025-05-23 NOTE — END OF VISIT
[Time Spent: ___ minutes] : I have spent [unfilled] minutes of time on the encounter which excludes teaching and separately reported services. [FreeTextEntry3] : 31 year old male presented initially with cardiogenic shock and acute on chronic RH failure in setting of severe Group 1 PAH / PVOD 11/22/2024 who is s/p bilateral LTx on 1/31/2025, here for a follow up visit.  Clinically stable, on room air Spirometry: FEV1:  2.73<--2.25<--2.66 PE: CTA bilaterally Tacrolimus level on 5/19 was 14.4 ng/mL, cont Tacro 0.5 mg BID (Target 10 - 15 ng/mL) Cellcept to 750 mg BID and Prednisone 17.5 mg daily TBBX 4/17/25 A0Bx, negative C4d BAL 4/17, bacterial cultures negative, but (+) rhinovirus Next TBBx (9-months) planned for 7/2025, needs to be scheduled DSA 5/16/25 - are pending Allosure 05/16/25 - 0.08% OI PPX: Bactrim, Valcyte BAL on 02/01 growing penicillium spp Posaconazole level on 05/05 was 1.6, goal is 1.0-1.5 Cont Posa 200 mg QD CMV/EBV PCR on 05/19 was negative LTBI: INH/B6. Transplant ID follow up L IJ Clot: Last UE/LE Duplex negative 3/12/25. Eliquis 2.5 mg BID, follow up surveillance dopplers GERD: Aspiration precautions. Rest of plan as above

## 2025-05-23 NOTE — ASSESSMENT
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 S/P Bilateral Lung Transplant 1/31/2025   #IS GOAL 10-12  Tacrolimus 0.5 mg BID - Prednisone 17.5 mg  - Cellcept 750mg BID  #OI - Valcyte 450mg BID - Bactrim SS daily - Posaconazole 200 mg daily  #LUNG TXP - Culture 2/1/25 rare penicillium - Posa 200mg BID - Transplant ID appointment pending   #GI Hx of GERD - Protonix 40 mg daily  #HEM Left IJ DVT Plan: discontinue after 3 months (5/13/25) - Eliquis 2.5 mg BID, started 2/13/25   #ID Latent TB therapy - Isoniazid 300 mg daily - Pyridoxine 50 mg daily  #ENDO DMT2 - Januvia 100mg daily, started by endo 4/30/25    FOLLOW UP - Routine weekly labs 5/27/2025 - Campbell reviewed, improved.  - 6-Month bronch 7/2025 - Needs sooner appointment with Dr. Garcia  - Repeat UE duplex ordered, pending appt. 5/29/25 - Continue to follow up with Transplant ID. - Continue to follow up with endocrinology - Pending NPA with Dr. Smith 5/28/25  - Appt. needed for Neuro- cognitive    RTC in 2 weeks with Campbell & CXR  All questions answered, used teach back method, patient verbalized understanding.  Above discussed with Dr. Cam

## 2025-05-28 NOTE — DISCUSSION/SUMMARY
[FreeTextEntry1] : In summary   Demetrio, 31 year old with a past medical history of S/p Lung Tx 2/2025 - Hyperlipidemia, Steroid-> T2DM =============== =============== S/p Lung Tx 2/2025 - Hyperlipidemia, Steroid-> T2DM - Discussed Diet & Exercise - Discussed Family Screening - Would not start cholesterol therapy at this point, but would monitor - Can consider Jardiance and not Januvia. But hopefully, diet/exercise can relieve issues - Follow up as needed  - Doing well from a cardiometabolic profile with the exception of steroid induced DM - that will improve and we will follow    Niko Centeno kind thanks for the referral.   Ag Smith MD [facility] JACKIE SOLANO Director, Preventive Cardiology [facility] Cardiovascular [place]                                                                                                     I have spent 45 minutes on the patient encounter including explaining and formulating rationale for treatment plan. >50% of time spent in direct counseling reviewing all tests, labs, and imaging and conferring with patient, family member, and other physicians regarding patient care.  Time Spent excludes separately reported billable services/teaching during the encounter.                                    -                                    -                                                                                                                                            --                                                                                                                                                                                                                                                                                                                                                                                                                                                                                                                                                                                                                                                                                                                            -----------  [EKG obtained to assist in diagnosis and management of assessed problem(s)] : EKG obtained to assist in diagnosis and management of assessed problem(s)

## 2025-05-28 NOTE — HISTORY OF PRESENT ILLNESS
[FreeTextEntry1] : Dear Niko & Jacobo,   I had the pleasure of seeing your patient JAYJAY BAZAN for Cardiometabolic evaluation.   As you know, he  is a Pleasant, 31 year old with a past medical history of S/p Lung Tx 2/2025 - Hyperlipidemia, Steroid-> T2DM =============== =============== S/p Lung Tx 2/2025 - Hyperlipidemia, Steroid-> T2DM - Discussed Diet & Exercise - Discussed Family Screening - Would not start cholesterol therapy at this point, but would monitor - Can consider Jardiance and not Januvia. But hopefully, diet/exercise can relieve issues - Follow up as needed  - Doing well from a cardiometabolic profile with the exception of steroid induced DM - that will improve and we will follow       CC: Heart Issues - Patient reports no chest pain, no localization to the sternum, no radiation to the neck/jaw, no alleviating nor worsening precipitants to CP, no assoc symptoms to CP - Patient notes no associated SOB/Palps/Leg swelling - Reports No associated F/C/N/V/Headaches - Reports Normal Exercise Tolerance - Reports no medication changes - Reports normal mood/quality of life - Reports no associated midnight awakenings from cP - Reports no diet changes - Reports no associated body aches - Reports no recent colds/viruses - Recent labs/imaging reviewed - Relevant Family history reviewed Mother/Father - CV Risk Assessment for 10 Year ACC/AHA Pooled Risk Cohort Equation places this person at < 7.5% Risk of ASCVD

## 2025-05-28 NOTE — DISCUSSION/SUMMARY
[FreeTextEntry1] : In summary   Demetrio, 31 year old with a past medical history of S/p Lung Tx 2/2025 - Hyperlipidemia, Steroid-> T2DM =============== =============== S/p Lung Tx 2/2025 - Hyperlipidemia, Steroid-> T2DM - Discussed Diet & Exercise - Discussed Family Screening - Would not start cholesterol therapy at this point, but would monitor - Can consider Jardiance and not Januvia. But hopefully, diet/exercise can relieve issues - Follow up as needed  - Doing well from a cardiometabolic profile with the exception of steroid induced DM - that will improve and we will follow    Niko Centeno kind thanks for the referral.   Ag Smith MD [facility] JACKIE SOLANO Director, Preventive Cardiology [facility] Cardiovascular [place]                                                                                                     I have spent 45 minutes on the patient encounter including explaining and formulating rationale for treatment plan. >50% of time spent in direct counseling reviewing all tests, labs, and imaging and conferring with patient, family member, and other physicians regarding patient care.  Time Spent excludes separately reported billable services/teaching during the encounter.                                    -                                    -                                                                                                                                            --                                                                                                                                                                                                                                                                                                                                                                                                                                                                                                                                                                                                                                                                                                                            -----------  [EKG obtained to assist in diagnosis and management of assessed problem(s)] : EKG obtained to assist in diagnosis and management of assessed problem(s)

## 2025-06-04 NOTE — END OF VISIT
[Time Spent: ___ minutes] : I have spent [unfilled] minutes of time on the encounter which excludes teaching and separately reported services. [FreeTextEntry3] : 31 year old male presented initially with cardiogenic shock and acute on chronic RH failure in setting of severe Group 1 PAH / PVOD 11/22/2024 who is s/p bilateral LTx on 1/31/2025, here for a follow up visit.  Clinically stable, on room air CXR (06/02): no cardiopulmonary abnormalities Spirometry: FEV1: 2.71<--2.73<--2.25<--2.66, stable PE: CTA bilaterally Tacrolimus level on 06/02 was 8.4 ng/mL, increased Tacro to 1.0/0.5 mg BID (Target 10 - 15 ng/mL) Cellcept to 750 mg BID and Prednisone 17.5 mg daily TBBX 4/17/25 A0Bx, negative C4d BAL 4/17, bacterial cultures negative, but (+) rhinovirus Next TBBx (9-months) planned for 7/2025, needs to be scheduled DSA 5/16/25 - no DSA >/= 2000 MFI detected against donor HLA Ags Allosure 05/16/25 - 0.08% OI PPX: Bactrim, Valcyte BAL on 02/01 growing penicillium spp Posaconazole level on 05/27 was 1.1, goal is 1.0-1.5 Cont Posa 200 mg QD CMV/EBV PCR on 06/02 was negative LTBI: INH/B6. Transplant ID follow up L IJ Clot: last LE duplex was negative, will need UE duplex as well. Eliquis 2.5 mg BID, follow up surveillance dopplers GERD: Aspiration precautions. Rest of plan as above.

## 2025-06-04 NOTE — HISTORY OF PRESENT ILLNESS
[TextBox_4] : PRE Transplant History: 31 year old male PVOD associated with right heart failure who presented with acute hypoxic respiratory failure and cardiogenic shock. Admitted to CCU and treated with Shankar and milrinone. Initiated on Epoprostenol  CT Chest showed centrilobular ground-glass nodules, interlobular septal thickening, and mediastinal lymphadenopathy. Course complicated by pneumothorax following placement of PAC. He underwent bronchoscopy and EBUS on 12/10/24. Lymph node negative for malignancy.   Following stabilization in the ICU patient was transferred to RCU 24. Tested positive for RSV 24 s/p course of Ribavirin. Evaluated by dental 24 for jaw pain and found to have inflamed gingiva around #17 distal and lingual, with generalized plaque accumulation and gross debris/plaque s/p irrigation and cleaning. No active infection noted. RRT on 24 in setting of difficulty with Flolan infusion running through PICC Line. Flolan was subsequently changed to R IJ but then became symptomatic with tachycardia, flushing and chest pain. Patient was transferred to MICU for further monitoring. Transferred back to RCU 24. Left anterior chest wall Pandya catheter placemed for Flolan infusion 24.  Listed:  Surgery: BILATERAL LUNG TRANSPLANT 25   Donor:  CMV + / EBV + / Toxo - , PHS risk Y/N Recipient: CMV +  / EBV +  / Toxo -  Induction: simluect 20mg x2,, medrol 1 g  Total Ischemic Time: RIGHT LUN" LEFT LUN" Extubated:25  Hospital Course: Bilateral lung transplant 25. Extubated on 25. Postoperative course notable for right main stem bronchus with necrotic material and persistent air leak requiring prolonged chest tube placement. Fluid cultures 25 grew penicillium and paecilomyces for which he is on posaconazole. Also developed an acute L IJ DVT treated with lose dose Eliquis. He also tested positive for COVID initially on 3/4/25 treated with a 5 day course of Remdesivir. He remained asymptomatic. Patient discharged home 3/17/25.  TELE-Visit 3/19/2025: Initial visit is remote given positive COVID. He remains asymptomatic. Feels somewhat tired after climbing stairs at home. Also notes some chest tightness around the chest, which was present during his hospital stay. He is taking his medications as instructed using his pill box, which he had on him during the visit. He is taking strict aspiration precautions, which is also being enforced by his family. He is staying with his brother and designated caretaker Jayson, who is also present for the visit. He is ambulating daily without issue. Patient has vitals available for review, which were checked at home. Reported SpO2 97 - 99% on room air. /75. T 97.4 F. Wt 129 lbs.  CLINIC 3/26/2025: Patient presents to clinic for routine follow up appointment post hospital discharge.  Recent COVID infection, last RVP negative, SPI improved, best to date, denies respiratory complications. Complained of sore throat/esophageal pain X 3 days. Thyroid assessed, appears normal, thyroid panel and DSA/Allosure drawn. Chest tube sutures removed, incisions healing well, no signs of infection noted. Medications reviewed with patient and brother, patient and brother verbalized understanding.   CLINIC 25: Slight decline in spirometry, not concerning as home sandy trend is stable, DSA/Allosure repeated for further surveillance. Patient complained of sore thrat X1-2 days, denies sick contact, RVP swab sent. Patient also complained of numbness and tingling in hands and fingers, will reassess next week and consider adding gabapentin.  IS/Oi medications are stable; no changes this week. Routine labs to be repeated on Monday  CLINIC 25: RVP 4/3 + enterovirus and COVID. Started on a prednisone taper starting at 40 mg daily. Has further decline in spirometry from 2.63 last week to 2.49 today. Allosure was checked 3/26/25 and was 0.06%. DSA 0%. Has mild sore throat. No fevers. No shortness of breath. Patient's brother accompanied him today. Patient reports compliance with medications. His brother states that he is trying to encourage more physical activity.  Readmitted 4/10 - 25 for concern for respiratory symptoms, COVID infection with worsening PFTs. CT chest negative, + enterovirus on RVP. Covid was negative. Transplant ID consulted. Completed empiric course of levaquin inpatient.  CLINIC 2025: Mr. Buck was seen and examined by me and the ACP on 2025 at [address] in a transplant office and I reviewed his data which was extensive the day before the scheduled visitation completely including lab data and radiographic information.  CLINIC 2025: CLINIC 2025: Patient clinically stable, spirometry improved, denies respiratory complications. Cellecpt increased to 500 mg BID. Posa decreased to 200mg as trough is 2.7. Metformin 500mg daily started by Dr. Claire on ; discontinued today as Metformin is not a preferred drug of choice for post-transplant patients. an appointment was facilitated with Dr. Toro on 25.  CLINIC 2025: Patient presents for routine follow up appointment. Reports feeling well. No respiratory complaints. Has intermittent feeling of chest tightness not associated with dyspnea. Also notes stiff legs in the middle of the night which improves during daytime. He reports compliance with medications.  CLINIC 2025: Patient presents for routine follow up appointment. Spirometry improved from last. Denies respiratory complications. Complained of neuropathy around incision sites, incisions are healed, no signs of infection. Patient also endorses leg cramps and stiffness., increase hydration with electrolytes encouraged. Patient states he has moved back to parent's house with his wife and son. Reports wife now helps him with medication and pill box.  CLINIC 2025: Spirometry declined. Stable respiratory status. Denies SOB or cough. Fell yesterday with associated head strike. Has bruise on scalp. Painful to touch. Also has mild right knee pain. Denies LOC. Denies headache.  Sent patient to the ER for CT of head.  DSA/Allosure drawn and sent today for declined Spirometry. Patient to follow up with Dr. Garcia to evaluate the need of Eliquis. Tacrolimus increased to 0.5mg BID and Cellcept increased to 750mg BID. Medication changes reviewed with patient, med-action updated and provided.    CLINIC 25: Spirometry improved, denies respiratory complications, reports doing well. Patient stated having right leg and right-hand cramps and stiffness, recommended to increase hydration. Pending appointment for Duplex to re-evaluate Left IJ DVT and possibly discontinuing Eliquis.   CLINIC 2025: ****   DONOR CULTURES:  CHECK UNET POD 1,3,7,10,14 2025: respiratory cx: pseudomonas fluoresecens 25 blood cx: ngtd 25 R BAL: <10k staph aureus 25 L BAL: <10k staph aureus 25 urine cx: ngtd 25: bld cx: staph epi?? - I'm not seeing this on unet?  CULTURES: : OR: rare yeast, rare mold, + pseudomonas flurosecens : OR: candida parapsilosis, s/p  caspofungin : OR: rare penicillum  25 body fluid: rare candida, rare pseudomonas fluorescens 25 body fluid cx: rare candida parapsilosis, rare penicillium, rare paecilomyces 25: BAL: <10k growth 25 BAL: ngtd 25: NGTD 3/4/25: BAL + COVID (remdesivir 3/5 - 3/10) 3/7/25: RVP + COVID 3/13: RVP + COVID  2025: Rhinovirus->monitor/NGTD   TBBX: 1 MONTH: 3/4/25: A0Bx, C4D neg 3 MONTH: 2025 A0Bx GMS and C4d negative 6 MONTH: 2025 9 MONTH: 10/2025 12 MONTH: 2026   DSA: 1 WEEK: (high risk): 25: 0%, NO DSA 2 WEEK: (high risk): 25: cPRA 0% NO DSA >2000 Late 1 month SENT 3/4/25; no Dsa, CPRA0% Repeated 3/26: 0% 3 MONTH: 2025 0% Repeat 25 0% 6 MONTH  9 MONTH  12 MONTH  ALLOSURE: Late 1 month: SENT 3/4/25: 0.19% Repeat 3/26: 0.06% Repeated 2025 3 MONTH: 2025 0.06% Repeat: Decline FEV1 2025: 0.08% 6 MONTH  9 MONTH  12 MONTH   SPIROMETRY 2/13/202 inpatient	 FVC 2.1	    FEV1 1.82 2025 inpatient	 FVC 2.21    FEV1 2.08 2025 inpatient	 FVC 1.54    FEV1 1.16 2025 inpatient	 FVC 2.07    FEV1 2.05 3/3/2025	inpatient	 FVC 1.94    FEV1 1.77 3/10/2025 inpatient	 FVC 2.04    FEV1 1.85 3/13/2025 inpatient	 FVC 2.21    FEV1 2.06 3/17/2025 inpatient	 FVC 2.06    FEV1 1.94 3/26/2025 [facility]:   FVC 2.98   FEV1 2.92 2025 [facility]:  FVC 2.70   FEV1 2.63 2025 [facility]:  FVC 2.61   FEV1 2.49 2025 [facility]:  FVC 2.53   FEV1 2.40 2025 [facility]:  FVC 2.69   FEV1 2.58 2025 [facility]     FVC 2.65    FEV1 2.58 2025 [facility]:  FVC 2.71    FEV1 2.66 2025 [facility]:  FVC 2.32    FEV1 2.25 2025 [facility]: FVC 2.87    FEV1 2.73 2025 [facility]   FVC**       FEV1**

## 2025-06-04 NOTE — ASSESSMENT
[FreeTextEntry1] : 30yo male newly dx with PAH & RV failure, former smoker. S/P Bilateral Lung Transplant 1/31/2025   #IS GOAL 10-12 Tacro level 6/4/25: ***  Tacrolimus 0.5 mg BID - Prednisone 17.5 mg  - Cellcept 750mg BID  #OI - Valcyte 450mg BID - Bactrim SS daily - Posaconazole 200 mg daily  #LUNG TXP - Culture 2/1/25 rare penicillium - Posa 200mg BID - Transplant ID appointment pending   #GI Hx of GERD - Protonix 40 mg daily  #HEM Left IJ DVT Plan: discontinue after 3 months (5/13/25) - Eliquis 2.5 mg BID, started 2/13/25 Repeat duplex prior to stopping    #ID Latent TB therapy - Isoniazid 300 mg daily - Pyridoxine 50 mg daily  #ENDO DMT2 - Januvia 100mg daily, started by endo 4/30/25    FOLLOW UP - Routine weekly labs 6/9/2025 - Mendez reviewed, **** - 6-Month bronch 7/2025 - Needs sooner appointment with Dr. Garcia  - Repeat UE duplex ordered, pending appt. 5/29/25 - Continue to follow up with Transplant ID. - Continue to follow up with endocrinology - Pending NPA with Dr. Smith 5/28/25  - Appt. needed for Neuro- cognitive    RTC in 2 weeks with Mendez & CXR  All questions answered, used teach back method, patient verbalized understanding.  Above discussed with Dr. Cam

## 2025-06-04 NOTE — ASSESSMENT
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 S/P Bilateral Lung Transplant 1/31/2025   #IS GOAL 10-12 Tacro level 6/4/25: ***  Tacrolimus 0.5 mg BID - Prednisone 17.5 mg  - Cellcept 750mg BID  #OI - Valcyte 450mg BID - Bactrim SS daily - Posaconazole 200 mg daily  #LUNG TXP - Culture 2/1/25 rare penicillium - Posa 200mg BID - Transplant ID appointment pending   #GI Hx of GERD - Protonix 40 mg daily  #HEM Left IJ DVT Plan: discontinue after 3 months (5/13/25) - Eliquis 2.5 mg BID, started 2/13/25 Repeat duplex prior to stopping    #ID Latent TB therapy - Isoniazid 300 mg daily - Pyridoxine 50 mg daily  #ENDO DMT2 - Januvia 100mg daily, started by endo 4/30/25    FOLLOW UP - Routine weekly labs 6/9/2025 - Mendez reviewed, **** - 6-Month bronch 7/2025 - Needs sooner appointment with Dr. Garcia  - Repeat UE duplex ordered, pending appt. 5/29/25 - Continue to follow up with Transplant ID. - Continue to follow up with endocrinology - Pending NPA with Dr. Smith 5/28/25  - Appt. needed for Neuro- cognitive    RTC in 2 weeks with Mendez & CXR  All questions answered, used teach back method, patient verbalized understanding.  Above discussed with Dr. Cam

## 2025-06-26 NOTE — ASSESSMENT
[FreeTextEntry1] : 30yo male newly dx with PAH & RV failure, former smoker. S/P Bilateral Lung Transplant 1/31/2025   #Lung TXP - 2/1/25 body fluid cx: rare candida parapsilosis, rare penicillium, rare paecilomyces-> Posa started - 3/4/2025: COVID-> admitted for remdesivir infusion  #IS GOAL 10-12 Tacro level 6/16/25: 11.8  Tacrolimus 1mg BID - Prednisone 17.5 mg  - Cellcept 750 mg BID  #OI - Valcyte 450 mg BID - Bactrim SS daily - Posaconazole 200 mg daily   #GI Hx of GERD - Protonix 40 mg daily  #HEM Left IJ DVT Plan: discontinue after 3 months (5/13/25) - Eliquis 2.5 mg BID, started 2/13/25 Repeat duplex prior to stopping    #ID Latent TB therapy - Isoniazid 300 mg daily - Pyridoxine 50 mg daily  #ENDO DMT2 - Januvia 100mg daily, started by endo 4/30/25    FOLLOW UP - Routine weekly labs 6/23/2025 - Mendez reviewed, improved. - 6-Month bronch 7/2025 - Upcoming appointment with Dr. Garcia  - Continue to follow up with Transplant ID. - Continue to follow up with endocrinology - Continue to follow up with Dr. Smith, Lipidologist - Appt. needed for Neuro- cognitive         RTC in 2 weeks with [place] & CXR  All questions answered, used teach back method, patient verbalized understanding.  Above discussed with Dr. Sweeney

## 2025-06-26 NOTE — END OF VISIT
[Time Spent: ___ minutes] : I have spent [unfilled] minutes of time on the encounter which excludes teaching and separately reported services. [FreeTextEntry3] : Seen and examined patient with NP/PA/ Fellow physician.  Independently verified the review of systems, physical examination, labs, radiological imaging. Also discussed with consultants to formulate eventual assessment and plan.    Assessment and plan:  Denies pulmonary symptoms.  He is not checking home spirometry.  Clinic spirometry chest x-ray are stable. Tolerating transplant medications well.  Matteo Sweeney MD, MPH  Lung Transplantation , Pulmonary and Critical care Medicine [facility]

## 2025-06-26 NOTE — ASSESSMENT
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 S/P Bilateral Lung Transplant 1/31/2025   #Lung TXP - 2/1/25 body fluid cx: rare candida parapsilosis, rare penicillium, rare paecilomyces-> Posa started - 3/4/2025: COVID-> admitted for remdesivir infusion  #IS GOAL 10-12 Tacro level 6/16/25: 11.8  Tacrolimus 1mg BID - Prednisone 17.5 mg  - Cellcept 750 mg BID  #OI - Valcyte 450 mg BID - Bactrim SS daily - Posaconazole 200 mg daily   #GI Hx of GERD - Protonix 40 mg daily  #HEM Left IJ DVT Plan: discontinue after 3 months (5/13/25) - Eliquis 2.5 mg BID, started 2/13/25 Repeat duplex prior to stopping    #ID Latent TB therapy - Isoniazid 300 mg daily - Pyridoxine 50 mg daily  #ENDO DMT2 - Januvia 100mg daily, started by endo 4/30/25    FOLLOW UP - Routine weekly labs 6/23/2025 - Mendez reviewed, improved. - 6-Month bronch 7/2025 - Upcoming appointment with Dr. Garcia  - Continue to follow up with Transplant ID. - Continue to follow up with endocrinology - Continue to follow up with Dr. Smith, Lipidologist - Appt. needed for Neuro- cognitive         RTC in 2 weeks with [place] & CXR  All questions answered, used teach back method, patient verbalized understanding.  Above discussed with Dr. Sweeney

## 2025-06-26 NOTE — HISTORY OF PRESENT ILLNESS
[TextBox_4] : PRE Transplant History: 31 year old male PVOD associated with right heart failure who presented with acute hypoxic respiratory failure and cardiogenic shock. Admitted to CCU and treated with Shankar and milrinone. Initiated on Epoprostenol  CT Chest showed centrilobular ground-glass nodules, interlobular septal thickening, and mediastinal lymphadenopathy. Course complicated by pneumothorax following placement of PAC. He underwent bronchoscopy and EBUS on 12/10/24. Lymph node negative for malignancy.   Following stabilization in the ICU patient was transferred to RCU 24. Tested positive for RSV 24 s/p course of Ribavirin. Evaluated by dental 24 for jaw pain and found to have inflamed gingiva around #17 distal and lingual, with generalized plaque accumulation and gross debris/plaque s/p irrigation and cleaning. No active infection noted. RRT on 24 in setting of difficulty with Flolan infusion running through PICC Line. Flolan was subsequently changed to R IJ but then became symptomatic with tachycardia, flushing and chest pain. Patient was transferred to MICU for further monitoring. Transferred back to RCU 24. Left anterior chest wall Pandya catheter placemed for Flolan infusion 24.  Listed:  Surgery: BILATERAL LUNG TRANSPLANT 25   Donor:  CMV + / EBV + / Toxo - , PHS risk Y/N Recipient: CMV +  / EBV +  / Toxo -  Induction: simluect 20mg x2,, medrol 1 g  Total Ischemic Time: RIGHT LUN" LEFT LUN" Extubated:25  Hospital Course: Bilateral lung transplant 25. Extubated on 25. Postoperative course notable for right main stem bronchus with necrotic material and persistent air leak requiring prolonged chest tube placement. Fluid cultures 25 grew penicillium and paecilomyces for which he is on posaconazole. Also developed an acute L IJ DVT treated with lose dose Eliquis. He also tested positive for COVID initially on 3/4/25 treated with a 5 day course of Remdesivir. He remained asymptomatic. Patient discharged home 3/17/25.  TELE-Visit 3/19/2025: Initial visit is remote given positive COVID. He remains asymptomatic. Feels somewhat tired after climbing stairs at home. Also notes some chest tightness around the chest, which was present during his hospital stay. He is taking his medications as instructed using his pill box, which he had on him during the visit. He is taking strict aspiration precautions, which is also being enforced by his family. He is staying with his brother and designated caretaker Jayson, who is also present for the visit. He is ambulating daily without issue. Patient has vitals available for review, which were checked at home. Reported SpO2 97 - 99% on room air. /75. T 97.4 F. Wt 129 lbs.  CLINIC 3/26/2025: Patient presents to clinic for routine follow up appointment post hospital discharge.  Recent COVID infection, last RVP negative, SPI improved, best to date, denies respiratory complications. Complained of sore throat/esophageal pain X 3 days. Thyroid assessed, appears normal, thyroid panel and DSA/Allosure drawn. Chest tube sutures removed, incisions healing well, no signs of infection noted. Medications reviewed with patient and brother, patient and brother verbalized understanding.   CLINIC 25: Slight decline in spirometry, not concerning as home sandy trend is stable, DSA/Allosure repeated for further surveillance. Patient complained of sore thrat X1-2 days, denies sick contact, RVP swab sent. Patient also complained of numbness and tingling in hands and fingers, will reassess next week and consider adding gabapentin.  IS/Oi medications are stable; no changes this week. Routine labs to be repeated on Monday  CLINIC 25: RVP 4/3 + enterovirus and COVID. Started on a prednisone taper starting at 40 mg daily. Has further decline in spirometry from 2.63 last week to 2.49 today. Allosure was checked 3/26/25 and was 0.06%. DSA 0%. Has mild sore throat. No fevers. No shortness of breath. Patient's brother accompanied him today. Patient reports compliance with medications. His brother states that he is trying to encourage more physical activity.  Readmitted 4/10 - 25 for concern for respiratory symptoms, COVID infection with worsening PFTs. CT chest negative, + enterovirus on RVP. Covid was negative. Transplant ID consulted. Completed empiric course of levaquin inpatient.  CLINIC 2025: Mr. Buck was seen and examined by me and the ACP on 2025 at [address] in a transplant office and I reviewed his data which was extensive the day before the scheduled visitation completely including lab data and radiographic information.  CLINIC 2025: CLINIC 2025: Patient clinically stable, spirometry improved, denies respiratory complications. Cellecpt increased to 500 mg BID. Posa decreased to 200mg as trough is 2.7. Metformin 500mg daily started by Dr. Claire on ; discontinued today as Metformin is not a preferred drug of choice for post-transplant patients. an appointment was facilitated with Dr. Toro on 25.  CLINIC 2025: Patient presents for routine follow up appointment. Reports feeling well. No respiratory complaints. Has intermittent feeling of chest tightness not associated with dyspnea. Also notes stiff legs in the middle of the night which improves during daytime. He reports compliance with medications.  CLINIC 2025: Patient presents for routine follow up appointment. Spirometry improved from last. Denies respiratory complications. Complained of neuropathy around incision sites, incisions are healed, no signs of infection. Patient also endorses leg cramps and stiffness., increase hydration with electrolytes encouraged. Patient states he has moved back to parent's house with his wife and son. Reports wife now helps him with medication and pill box.  CLINIC 2025: Spirometry declined. Stable respiratory status. Denies SOB or cough. Fell yesterday with associated head strike. Has bruise on scalp. Painful to touch. Also has mild right knee pain. Denies LOC. Denies headache.  Sent patient to the ER for CT of head.  DSA/Allosure drawn and sent today for declined Spirometry. Patient to follow up with Dr. Garcia to evaluate the need of Eliquis. Tacrolimus increased to 0.5mg BID and Cellcept increased to 750mg BID. Medication changes reviewed with patient, med-action updated and provided.    CLINIC 25: Spirometry improved, denies respiratory complications, reports doing well. Patient stated having right leg and right-hand cramps and stiffness, recommended to increase hydration. Pending appointment for Duplex to re-evaluate Left IJ DVT and possibly discontinuing Eliquis.   CLINIC 2025:  Patients' spirometry stable, no complaints of respiratory issues. Tacro increased to 1mg BID, c/o muscle spasms, hydration encouraged. PAtient was referred to Neuropsychologist for cognitive impairment and patient has refused appointment. Lower ext dopplers completed- benign; Upper extremity dopplers scheduled for today.   CLINIC 2025: Spirometry improved, no respiratory complaints, reports doing well. Patient continues to complain of muscle spasm of B/L LE at times, hydration encourage and to remain active. Discussed with patient to be compliant with appointments. Updated med-action provided to patient.      DONOR CULTURES:  CHECK UNET POD 1,3,7,10,2025: respiratory cx: pseudomonas fluoresecens 25 blood cx: ngtd 25 R BAL: <10k staph aureus 25 L BAL: <10k staph aureus 25 urine cx: ngtd 25: bld cx: staph epi?? - I'm not seeing this on unet?  CULTURES: : OR: rare yeast, rare mold, + pseudomonas flurosecens : OR: candida parapsilosis, s/p  caspofungin : OR: rare penicillum  25 body fluid: rare candida, rare pseudomonas fluorescens 25 body fluid cx: rare candida parapsilosis, rare penicillium, rare paecilomyces 25: BAL: <10k growth 25 BAL: ngtd 25: NGTD 3/4/25: BAL + COVID (remdesivir 3/5 - 3/10) 3/7/25: RVP + COVID 3/13: RVP + COVID  2025: Rhinovirus->monitor/NGTD   TBBX: 1 MONTH: 3/4/25: A0Bx, C4D neg 3 MONTH: 2025 A0Bx GMS and C4d negative 6 MONTH: 2025 9 MONTH: 10/2025 12 MONTH: 2026   DSA: 1 WEEK: (high risk): 25: 0%, NO DSA 2 WEEK: (high risk): 25: cPRA 0% NO DSA >2000 Late 1 month SENT 3/4/25; no Dsa, CPRA0% Repeated 3/26: 0% 3 MONTH: 2025 0% Repeat 25 0% 6 MONTH: 2025 9 MONTH:10/2025 12 MONTH: 2026  ALLOSURE: Late 1 month: SENT 3/4/25: 0.19% Repeat 3/26: 0.06% Repeated 2025 3 MONTH: 2025 0.06% Repeat: Decline FEV1 2025: 0.08% 6 MONTH: 2025 9 MONTH: 10/2025 12 MONTH: 2026   SPIROMETRY  inpatient	 FVC 2.1	    FEV1 1.82 2025 inpatient	 FVC 2.21    FEV1 2.08 2025 inpatient	 FVC 1.54    FEV1 1.16 2025 inpatient	 FVC 2.07    FEV1 2.05 3/3/2025	inpatient	 FVC 1.94    FEV1 1.77 3/10/2025 inpatient	 FVC 2.04    FEV1 1.85 3/13/2025 inpatient	 FVC 2.21    FEV1 2.06 3/17/2025 inpatient	 FVC 2.06    FEV1 1.94 3/26/2025 [facility]:   FVC 2.98   FEV1 2.92 2025 [facility]:  FVC 2.70   FEV1 2.63 2025 [facility]:  FVC 2.61   FEV1 2.49 2025 [facility]:  FVC 2.53   FEV1 2.40 2025 [facility]:  FVC 2.69   FEV1 2.58 2025 [facility]     FVC 2.65    FEV1 2.58 2025 [facility]:  FVC 2.71    FEV1 2.66 2025 [facility]:  FVC 2.32    FEV1 2.25 2025 [facility]: FVC 2.87    FEV1 2.73 2025 [facility]   FVC 2.79   FEV1 2.71 2025 [facility]:  FVC 2.92   FEV1 2.85

## 2025-06-26 NOTE — ASSESSMENT
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 S/P Bilateral Lung Transplant 1/31/2025   #Lung TXP - 2/1/25 body fluid cx: rare candida parapsilosis, rare penicillium, rare paecilomyces-> Posa started - 3/4/2025: COVID-> admitted for remdesivir infusion  #IS GOAL 10-12 Tacro level 6/16/25: 11.8  Tacrolimus 1mg BID - Prednisone 17.5 mg  - Cellcept 750 mg BID  #OI - Valcyte 450 mg BID - Bactrim SS daily - Posaconazole 200 mg daily   #GI Hx of GERD - Protonix 40 mg daily  #HEM Left IJ DVT Plan: discontinue after 3 months (5/13/25) - Eliquis 2.5 mg BID, started 2/13/25 Repeat duplex prior to stopping    #ID Latent TB therapy - Isoniazid 300 mg daily - Pyridoxine 50 mg daily  #ENDO DMT2 - Januvia 100mg daily, started by endo 4/30/25    FOLLOW UP - Routine weekly labs 6/23/2025 - Mendez reviewed, improved. - 6-Month bronch 7/2025 - Upcoming appointment with Dr. Garcia  - Continue to follow up with Transplant ID. - Continue to follow up with endocrinology - Continue to follow up with Dr. Smith, Lipidologist - Appt. needed for Neuro- cognitive         RTC in 2 weeks with [place] & CXR  All questions answered, used teach back method, patient verbalized understanding.  Above discussed with Dr. Sweeney

## 2025-06-26 NOTE — PHYSICAL EXAM
[No Acute Distress] : no acute distress [Normal Oropharynx] : normal oropharynx [Normal Appearance] : normal appearance [No Neck Mass] : no neck mass [Normal Rate/Rhythm] : normal rate/rhythm [No Resp Distress] : no resp distress [No Abnormalities] : no abnormalities [Benign] : benign [Normal Gait] : normal gait [No Edema] : no edema [Normal Color/ Pigmentation] : normal color/ pigmentation [No Focal Deficits] : no focal deficits [Oriented x3] : oriented x3 [Normal Affect] : normal affect

## 2025-06-26 NOTE — HISTORY OF PRESENT ILLNESS
[TextBox_4] : PRE Transplant History: 31 year old male PVOD associated with right heart failure who presented with acute hypoxic respiratory failure and cardiogenic shock. Admitted to CCU and treated with Shankar and milrinone. Initiated on Epoprostenol  CT Chest showed centrilobular ground-glass nodules, interlobular septal thickening, and mediastinal lymphadenopathy. Course complicated by pneumothorax following placement of PAC. He underwent bronchoscopy and EBUS on 12/10/24. Lymph node negative for malignancy.   Following stabilization in the ICU patient was transferred to RCU 24. Tested positive for RSV 24 s/p course of Ribavirin. Evaluated by dental 24 for jaw pain and found to have inflamed gingiva around #17 distal and lingual, with generalized plaque accumulation and gross debris/plaque s/p irrigation and cleaning. No active infection noted. RRT on 24 in setting of difficulty with Flolan infusion running through PICC Line. Flolan was subsequently changed to R IJ but then became symptomatic with tachycardia, flushing and chest pain. Patient was transferred to MICU for further monitoring. Transferred back to RCU 24. Left anterior chest wall Pandya catheter placemed for Flolan infusion 24.  Listed:  Surgery: BILATERAL LUNG TRANSPLANT 25   Donor:  CMV + / EBV + / Toxo - , PHS risk Y/N Recipient: CMV +  / EBV +  / Toxo -  Induction: simluect 20mg x2,, medrol 1 g  Total Ischemic Time: RIGHT LUN" LEFT LUN" Extubated:25  Hospital Course: Bilateral lung transplant 25. Extubated on 25. Postoperative course notable for right main stem bronchus with necrotic material and persistent air leak requiring prolonged chest tube placement. Fluid cultures 25 grew penicillium and paecilomyces for which he is on posaconazole. Also developed an acute L IJ DVT treated with lose dose Eliquis. He also tested positive for COVID initially on 3/4/25 treated with a 5 day course of Remdesivir. He remained asymptomatic. Patient discharged home 3/17/25.  TELE-Visit 3/19/2025: Initial visit is remote given positive COVID. He remains asymptomatic. Feels somewhat tired after climbing stairs at home. Also notes some chest tightness around the chest, which was present during his hospital stay. He is taking his medications as instructed using his pill box, which he had on him during the visit. He is taking strict aspiration precautions, which is also being enforced by his family. He is staying with his brother and designated caretaker Jayson, who is also present for the visit. He is ambulating daily without issue. Patient has vitals available for review, which were checked at home. Reported SpO2 97 - 99% on room air. /75. T 97.4 F. Wt 129 lbs.  CLINIC 3/26/2025: Patient presents to clinic for routine follow up appointment post hospital discharge.  Recent COVID infection, last RVP negative, SPI improved, best to date, denies respiratory complications. Complained of sore throat/esophageal pain X 3 days. Thyroid assessed, appears normal, thyroid panel and DSA/Allosure drawn. Chest tube sutures removed, incisions healing well, no signs of infection noted. Medications reviewed with patient and brother, patient and brother verbalized understanding.   CLINIC 25: Slight decline in spirometry, not concerning as home sandy trend is stable, DSA/Allosure repeated for further surveillance. Patient complained of sore thrat X1-2 days, denies sick contact, RVP swab sent. Patient also complained of numbness and tingling in hands and fingers, will reassess next week and consider adding gabapentin.  IS/Oi medications are stable; no changes this week. Routine labs to be repeated on Monday  CLINIC 25: RVP 4/3 + enterovirus and COVID. Started on a prednisone taper starting at 40 mg daily. Has further decline in spirometry from 2.63 last week to 2.49 today. Allosure was checked 3/26/25 and was 0.06%. DSA 0%. Has mild sore throat. No fevers. No shortness of breath. Patient's brother accompanied him today. Patient reports compliance with medications. His brother states that he is trying to encourage more physical activity.  Readmitted 4/10 - 25 for concern for respiratory symptoms, COVID infection with worsening PFTs. CT chest negative, + enterovirus on RVP. Covid was negative. Transplant ID consulted. Completed empiric course of levaquin inpatient.  CLINIC 2025: Mr. Buck was seen and examined by me and the ACP on 2025 at [address] in a transplant office and I reviewed his data which was extensive the day before the scheduled visitation completely including lab data and radiographic information.  CLINIC 2025: CLINIC 2025: Patient clinically stable, spirometry improved, denies respiratory complications. Cellecpt increased to 500 mg BID. Posa decreased to 200mg as trough is 2.7. Metformin 500mg daily started by Dr. Claire on ; discontinued today as Metformin is not a preferred drug of choice for post-transplant patients. an appointment was facilitated with Dr. Toro on 25.  CLINIC 2025: Patient presents for routine follow up appointment. Reports feeling well. No respiratory complaints. Has intermittent feeling of chest tightness not associated with dyspnea. Also notes stiff legs in the middle of the night which improves during daytime. He reports compliance with medications.  CLINIC 2025: Patient presents for routine follow up appointment. Spirometry improved from last. Denies respiratory complications. Complained of neuropathy around incision sites, incisions are healed, no signs of infection. Patient also endorses leg cramps and stiffness., increase hydration with electrolytes encouraged. Patient states he has moved back to parent's house with his wife and son. Reports wife now helps him with medication and pill box.  CLINIC 2025: Spirometry declined. Stable respiratory status. Denies SOB or cough. Fell yesterday with associated head strike. Has bruise on scalp. Painful to touch. Also has mild right knee pain. Denies LOC. Denies headache.  Sent patient to the ER for CT of head.  DSA/Allosure drawn and sent today for declined Spirometry. Patient to follow up with Dr. aGrcia to evaluate the need of Eliquis. Tacrolimus increased to 0.5mg BID and Cellcept increased to 750mg BID. Medication changes reviewed with patient, med-action updated and provided.    CLINIC 25: Spirometry improved, denies respiratory complications, reports doing well. Patient stated having right leg and right-hand cramps and stiffness, recommended to increase hydration. Pending appointment for Duplex to re-evaluate Left IJ DVT and possibly discontinuing Eliquis.   CLINIC 2025:  Patients' spirometry stable, no complaints of respiratory issues. Tacro increased to 1mg BID, c/o muscle spasms, hydration encouraged. PAtient was referred to Neuropsychologist for cognitive impairment and patient has refused appointment. Lower ext dopplers completed- benign; Upper extremity dopplers scheduled for today.   CLINIC 2025: Spirometry improved, no respiratory complaints, reports doing well. Patient continues to complain of muscle spasm of B/L LE at times, hydration encourage and to remain active. Discussed with patient to be compliant with appointments. Updated med-action provided to patient.      DONOR CULTURES:  CHECK UNET POD 1,3,7,10,2025: respiratory cx: pseudomonas fluoresecens 25 blood cx: ngtd 25 R BAL: <10k staph aureus 25 L BAL: <10k staph aureus 25 urine cx: ngtd 25: bld cx: staph epi?? - I'm not seeing this on unet?  CULTURES: : OR: rare yeast, rare mold, + pseudomonas flurosecens : OR: candida parapsilosis, s/p  caspofungin : OR: rare penicillum  25 body fluid: rare candida, rare pseudomonas fluorescens 25 body fluid cx: rare candida parapsilosis, rare penicillium, rare paecilomyces 25: BAL: <10k growth 25 BAL: ngtd 25: NGTD 3/4/25: BAL + COVID (remdesivir 3/5 - 3/10) 3/7/25: RVP + COVID 3/13: RVP + COVID  2025: Rhinovirus->monitor/NGTD   TBBX: 1 MONTH: 3/4/25: A0Bx, C4D neg 3 MONTH: 2025 A0Bx GMS and C4d negative 6 MONTH: 2025 9 MONTH: 10/2025 12 MONTH: 2026   DSA: 1 WEEK: (high risk): 25: 0%, NO DSA 2 WEEK: (high risk): 25: cPRA 0% NO DSA >2000 Late 1 month SENT 3/4/25; no Dsa, CPRA0% Repeated 3/26: 0% 3 MONTH: 2025 0% Repeat 25 0% 6 MONTH: 2025 9 MONTH:10/2025 12 MONTH: 2026  ALLOSURE: Late 1 month: SENT 3/4/25: 0.19% Repeat 3/26: 0.06% Repeated 2025 3 MONTH: 2025 0.06% Repeat: Decline FEV1 2025: 0.08% 6 MONTH: 2025 9 MONTH: 10/2025 12 MONTH: 2026   SPIROMETRY  inpatient	 FVC 2.1	    FEV1 1.82 2025 inpatient	 FVC 2.21    FEV1 2.08 2025 inpatient	 FVC 1.54    FEV1 1.16 2025 inpatient	 FVC 2.07    FEV1 2.05 3/3/2025	inpatient	 FVC 1.94    FEV1 1.77 3/10/2025 inpatient	 FVC 2.04    FEV1 1.85 3/13/2025 inpatient	 FVC 2.21    FEV1 2.06 3/17/2025 inpatient	 FVC 2.06    FEV1 1.94 3/26/2025 [facility]:   FVC 2.98   FEV1 2.92 2025 [facility]:  FVC 2.70   FEV1 2.63 2025 [facility]:  FVC 2.61   FEV1 2.49 2025 [facility]:  FVC 2.53   FEV1 2.40 2025 [facility]:  FVC 2.69   FEV1 2.58 2025 [facility]     FVC 2.65    FEV1 2.58 2025 [facility]:  FVC 2.71    FEV1 2.66 2025 [facility]:  FVC 2.32    FEV1 2.25 2025 [facility]: FVC 2.87    FEV1 2.73 2025 [facility]   FVC 2.79   FEV1 2.71 2025 [facility]:  FVC 2.92   FEV1 2.85

## 2025-06-26 NOTE — ASSESSMENT
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 S/P Bilateral Lung Transplant 1/31/2025   #Lung TXP - 2/1/25 body fluid cx: rare candida parapsilosis, rare penicillium, rare paecilomyces-> Posa started - 3/4/2025: COVID-> admitted for remdesivir infusion  #IS GOAL 10-12 Tacro level 6/16/25: 11.8  Tacrolimus 1mg BID - Prednisone 17.5 mg  - Cellcept 750 mg BID  #OI - Valcyte 450 mg BID - Bactrim SS daily - Posaconazole 200 mg daily   #GI Hx of GERD - Protonix 40 mg daily  #HEM Left IJ DVT Plan: discontinue after 3 months (5/13/25) - Eliquis 2.5 mg BID, started 2/13/25 Repeat duplex prior to stopping    #ID Latent TB therapy - Isoniazid 300 mg daily - Pyridoxine 50 mg daily  #ENDO DMT2 - Januvia 100mg daily, started by endo 4/30/25    FOLLOW UP - Routine weekly labs 6/23/2025 - Mendez reviewed, improved. - 6-Month bronch 7/2025 - Upcoming appointment with Dr. Garcia  - Continue to follow up with Transplant ID. - Continue to follow up with endocrinology - Continue to follow up with Dr. Smith, Lipidologist - Appt. needed for Neuro- cognitive         RTC in 2 weeks with [place] & CXR  All questions answered, used teach back method, patient verbalized understanding.  Above discussed with Dr. Sweeney

## 2025-06-26 NOTE — END OF VISIT
[Time Spent: ___ minutes] : I have spent [unfilled] minutes of time on the encounter which excludes teaching and separately reported services. [FreeTextEntry3] : Seen and examined patient with NP/PA/ Fellow physician.  Independently verified the review of systems, physical examination, labs, radiological imaging. Also discussed with consultants to formulate eventual assessment and plan.    Assessment and plan:  Denies pulmonary symptoms.  He is not checking home spirometry.  Clinic spirometry chest x-ray are stable. Tolerating transplant medications well.  Matteo Sweeney MD, MPH  Lung Transplantation , Pulmonary and Critical care Medicine [facility]

## 2025-06-26 NOTE — END OF VISIT
[Time Spent: ___ minutes] : I have spent [unfilled] minutes of time on the encounter which excludes teaching and separately reported services. [FreeTextEntry3] : Seen and examined patient with NP/PA/ Fellow physician.  Independently verified the review of systems, physical examination, labs, radiological imaging. Also discussed with consultants to formulate eventual assessment and plan.    Assessment and plan:  Denies pulmonary symptoms.  He is not checking home spirometry.  Clinic spirometry chest x-ray are stable. Tolerating transplant medications well.  Matteo Sweeney MD, MPH  Lung Transplantation , Pulmonary and Critical care Medicine [facility]

## 2025-06-26 NOTE — END OF VISIT
[Time Spent: ___ minutes] : I have spent [unfilled] minutes of time on the encounter which excludes teaching and separately reported services. [FreeTextEntry3] : Seen and examined patient with NP/PA/ Fellow physician.  Independently verified the review of systems, physical examination, labs, radiological imaging. Also discussed with consultants to formulate eventual assessment and plan.    Assessment and plan:  Denies pulmonary symptoms.  He is not checking home spirometry.  Clinic spirometry chest x-ray are stable. Tolerating transplant medications well.  Matteo Sweeney MD, MPH  Lung Transplantation , Pulmonary and Critical care Medicine [facility]

## 2025-07-08 NOTE — END OF VISIT
[Time Spent: ___ minutes] : I have spent [unfilled] minutes of time on the encounter which excludes teaching and separately reported services. [FreeTextEntry3] : Seen and examined patient with NP/PA/ Fellow physician.  Independently verified the review of systems, physical examination, labs, radiological imaging. Also discussed with consultants to formulate eventual assessment and plan.    Assessment and plan:  Overall feels well from pulmonary perspective and denies new symptoms.  Chest x-ray spirometry stable.  Tolerating transplant medications well.  Reports itchy rash on the chest-folliculitis.  Likely heat rash.  Suggested Benadryl cream and calamine lotion Continues to report persistent  cramping in the lower extremities.  Check MRI lumbosacral spine for nerve impingement  Discussed regarding need for taking transplant medications in a timely fashion.  He favors 10 AM/10 PM.  Advised him to get labs at 9 AM.  He requests clinic visits at 12 noon.  Matteo Sweeney MD, MPH  Lung Transplantation , Pulmonary and Critical care Medicine [facility]     Matteo Sweeney MD, MPH  Lung Transplantation , Pulmonary and Critical care Medicine [facility]

## 2025-07-08 NOTE — END OF VISIT
[Time Spent: ___ minutes] : I have spent [unfilled] minutes of time on the encounter which excludes teaching and separately reported services. [FreeTextEntry3] : Seen and examined patient with NP/PA/ Fellow physician.  Independently verified the review of systems, physical examination, labs, radiological imaging. Also discussed with consultants to formulate eventual assessment and plan.    Assessment and plan:  Overall feels well from pulmonary perspective and denies new symptoms.  Chest x-ray spirometry stable.  Tolerating transplant medications well.  Reports itchy rash on the chest-folliculitis.  Likely heat rash.  Suggested Benadryl cream and calamine lotion Continues to report persistent  cramping in the lower extremities.  Check MRI lumbosacral spine for nerve impingement  Discussed regarding need for taking transplant medications in a timely fashion.  He favors 10 AM/10 PM.  Advised him to get labs at 9 AM.  He requests clinic visits at 12 noon.  Matteo Sweeney MD, MPH  Lung Transplantation , Pulmonary and Critical care Medicine [facility]     Matteo Sweeney MD, MPH  Lung Transplantation , Pulmonary and Critical care Medicine [facility]

## 2025-07-08 NOTE — ASSESSMENT
[FreeTextEntry1] : 30yo male newly dx with PAH & RV failure, former smoker. S/P Bilateral Lung Transplant 1/31/2025   #Lung TXP - 2/1/25 body fluid cx: rare candida parapsilosis, rare penicillium, rare paecilomyces-> Posa started - 3/4/2025: COVID-> admitted for remdesivir infusion  #IS GOAL 10-12 Tacro level 6/30/25: 14.0  Tacrolimus 1/0.5 mg BID - Prednisone 17.5 mg  - Cellcept 500 mg BID, decreased to 250mg BID.   #OI - Valcyte 450 mg BID - Bactrim SS daily - Posaconazole 200 mg daily   #GI Hx of GERD - Protonix 40 mg daily  #HEM Left IJ DVT Plan: discontinue after 3 months (5/13/25) - Eliquis 2.5 mg BID, started 2/13/25 Repeat duplex prior to stopping    #ID Latent TB therapy - Isoniazid 300 mg daily - Pyridoxine 50 mg daily  #ENDO DMT2 - Januvia 100mg daily, started by endo 4/30/25    FOLLOW UP - Routine weekly labs 7/02/2025 - Mendez reviewed, stable - 6-Month bronch 7/17/2025 - Upcoming appointment with Dr. Garcia  - Continue to follow up with Transplant ID. - Continue to follow up with endocrinology - Continue to follow up with Dr. Smith, Lipidologist       RTC in 1 weeks with [place] & CXR  All questions answered, used teach back method, patient verbalized understanding.  Above discussed with Dr. Sweeney

## 2025-07-08 NOTE — END OF VISIT
[Time Spent: ___ minutes] : I have spent [unfilled] minutes of time on the encounter which excludes teaching and separately reported services. [FreeTextEntry3] : Seen and examined patient with NP/PA/ Fellow physician.  Independently verified the review of systems, physical examination, labs, radiological imaging. Also discussed with consultants to formulate eventual assessment and plan.    Assessment and plan:  Overall feels well from pulmonary perspective and denies new symptoms.  Chest x-ray spirometry stable.  Tolerating transplant medications well.  Reports itchy rash on the chest-folliculitis.  Likely heat rash.  Suggested Benadryl cream and calamine lotion Continues to report persistent  cramping in the lower extremities.  Check MRI lumbosacral spine for nerve impingement  Discussed regarding need for taking transplant medications in a timely fashion.  He favors 10 AM/10 PM.  Advised him to get labs at 9 AM.  He requests clinic visits at 12 noon.  Matteo Sweeney MD, MPH  Lung Transplantation , Pulmonary and Critical care Medicine [facility]     Matteo Sweeney MD, MPH  Lung Transplantation , Pulmonary and Critical care Medicine [facility]

## 2025-07-08 NOTE — HISTORY OF PRESENT ILLNESS
[TextBox_4] : PRE Transplant History: 31 year old male PVOD associated with right heart failure who presented with acute hypoxic respiratory failure and cardiogenic shock. Admitted to CCU and treated with Shankar and milrinone. Initiated on Epoprostenol  CT Chest showed centrilobular ground-glass nodules, interlobular septal thickening, and mediastinal lymphadenopathy. Course complicated by pneumothorax following placement of PAC. He underwent bronchoscopy and EBUS on 12/10/24. Lymph node negative for malignancy.   Following stabilization in the ICU patient was transferred to RCU 24. Tested positive for RSV 24 s/p course of Ribavirin. Evaluated by dental 24 for jaw pain and found to have inflamed gingiva around #17 distal and lingual, with generalized plaque accumulation and gross debris/plaque s/p irrigation and cleaning. No active infection noted. RRT on 24 in setting of difficulty with Flolan infusion running through PICC Line. Flolan was subsequently changed to R IJ but then became symptomatic with tachycardia, flushing and chest pain. Patient was transferred to MICU for further monitoring. Transferred back to RCU 24. Left anterior chest wall Pandya catheter placemed for Flolan infusion 24.  Listed:  Surgery: BILATERAL LUNG TRANSPLANT 25   Donor:  CMV + / EBV + / Toxo - , PHS risk Y/N Recipient: CMV +  / EBV +  / Toxo -  Induction: simluect 20mg x2,, medrol 1 g  Total Ischemic Time: RIGHT LUN" LEFT LUN" Extubated:25  Hospital Course: Bilateral lung transplant 25. Extubated on 25. Postoperative course notable for right main stem bronchus with necrotic material and persistent air leak requiring prolonged chest tube placement. Fluid cultures 25 grew penicillium and paecilomyces for which he is on posaconazole. Also developed an acute L IJ DVT treated with lose dose Eliquis. He also tested positive for COVID initially on 3/4/25 treated with a 5 day course of Remdesivir. He remained asymptomatic. Patient discharged home 3/17/25.  TELE-Visit 3/19/2025: Initial visit is remote given positive COVID. He remains asymptomatic. Feels somewhat tired after climbing stairs at home. Also notes some chest tightness around the chest, which was present during his hospital stay. He is taking his medications as instructed using his pill box, which he had on him during the visit. He is taking strict aspiration precautions, which is also being enforced by his family. He is staying with his brother and designated caretaker Jayson, who is also present for the visit. He is ambulating daily without issue. Patient has vitals available for review, which were checked at home. Reported SpO2 97 - 99% on room air. /75. T 97.4 F. Wt 129 lbs.  CLINIC 3/26/2025: Patient presents to clinic for routine follow up appointment post hospital discharge.  Recent COVID infection, last RVP negative, SPI improved, best to date, denies respiratory complications. Complained of sore throat/esophageal pain X 3 days. Thyroid assessed, appears normal, thyroid panel and DSA/Allosure drawn. Chest tube sutures removed, incisions healing well, no signs of infection noted. Medications reviewed with patient and brother, patient and brother verbalized understanding.   CLINIC 25: Slight decline in spirometry, not concerning as home sandy trend is stable, DSA/Allosure repeated for further surveillance. Patient complained of sore thrat X1-2 days, denies sick contact, RVP swab sent. Patient also complained of numbness and tingling in hands and fingers, will reassess next week and consider adding gabapentin.  IS/Oi medications are stable; no changes this week. Routine labs to be repeated on Monday  CLINIC 25: RVP 4/3 + enterovirus and COVID. Started on a prednisone taper starting at 40 mg daily. Has further decline in spirometry from 2.63 last week to 2.49 today. Allosure was checked 3/26/25 and was 0.06%. DSA 0%. Has mild sore throat. No fevers. No shortness of breath. Patient's brother accompanied him today. Patient reports compliance with medications. His brother states that he is trying to encourage more physical activity.  Readmitted 4/10 - 25 for concern for respiratory symptoms, COVID infection with worsening PFTs. CT chest negative, + enterovirus on RVP. Covid was negative. Transplant ID consulted. Completed empiric course of levaquin inpatient.  CLINIC 2025: Mr. Buck was seen and examined by me and the ACP on 2025 at [address] in a transplant office and I reviewed his data which was extensive the day before the scheduled visitation completely including lab data and radiographic information.  CLINIC 2025: CLINIC 2025: Patient clinically stable, spirometry improved, denies respiratory complications. Cellecpt increased to 500 mg BID. Posa decreased to 200mg as trough is 2.7. Metformin 500mg daily started by Dr. Claire on ; discontinued today as Metformin is not a preferred drug of choice for post-transplant patients. an appointment was facilitated with Dr. Toro on 25.  CLINIC 2025: Patient presents for routine follow up appointment. Reports feeling well. No respiratory complaints. Has intermittent feeling of chest tightness not associated with dyspnea. Also notes stiff legs in the middle of the night which improves during daytime. He reports compliance with medications.  CLINIC 2025: Patient presents for routine follow up appointment. Spirometry improved from last. Denies respiratory complications. Complained of neuropathy around incision sites, incisions are healed, no signs of infection. Patient also endorses leg cramps and stiffness., increase hydration with electrolytes encouraged. Patient states he has moved back to parent's house with his wife and son. Reports wife now helps him with medication and pill box.  CLINIC 2025: Spirometry declined. Stable respiratory status. Denies SOB or cough. Fell yesterday with associated head strike. Has bruise on scalp. Painful to touch. Also has mild right knee pain. Denies LOC. Denies headache.  Sent patient to the ER for CT of head.  DSA/Allosure drawn and sent today for declined Spirometry. Patient to follow up with Dr. Garcia to evaluate the need of Eliquis. Tacrolimus increased to 0.5mg BID and Cellcept increased to 750mg BID. Medication changes reviewed with patient, med-action updated and provided.    CLINIC 25: Spirometry improved, denies respiratory complications, reports doing well. Patient stated having right leg and right-hand cramps and stiffness, recommended to increase hydration. Pending appointment for Duplex to re-evaluate Left IJ DVT and possibly discontinuing Eliquis.   CLINIC 2025:  Patients' spirometry stable, no complaints of respiratory issues. Tacro increased to 1mg BID, c/o muscle spasms, hydration encouraged. PAtient was referred to Neuropsychologist for cognitive impairment and patient has refused appointment. Lower ext dopplers completed- benign; Upper extremity dopplers scheduled for today.   CLINIC 2025: Spirometry improved, no respiratory complaints, reports doing well. Patient continues to complain of muscle spasm of B/L LE at times, hydration encourage and to remain active. Discussed with patient to be compliant with appointments. Updated med-action provided to patient.    CLINIC 2025: Spirometry stable, denies any respiratory complications, reports doing well. Lumbar sacral MRI ordered for continued muscle spasms and numbness to lower extremities. Patient reported heat rash to chest from being outside walking, Calamine lotion or Benadryl cream OTC recommended. Decreased Cellcept to 250mg BID due to neutropenia. updated Med-action provided to patient.     DONOR CULTURES:  CHECK UNET POD 1,3,7,10,14 2025: respiratory cx: pseudomonas fluoresecens 25 blood cx: ngtd 25 R BAL: <10k staph aureus 25 L BAL: <10k staph aureus 25 urine cx: ngtd 25: bld cx: staph epi?? - I'm not seeing this on unet?  CULTURES: : OR: rare yeast, rare mold, + pseudomonas flurosecens : OR: candida parapsilosis, s/p  caspofungin : OR: rare penicillum  25 body fluid: rare candida, rare pseudomonas fluorescens 25 body fluid cx: rare candida parapsilosis, rare penicillium, rare paecilomyces 25: BAL: <10k growth 25 BAL: ngtd 25: NGTD 3/4/25: BAL + COVID (remdesivir 3/5 - 3/10) 3/7/25: RVP + COVID 3/13: RVP + COVID  2025: Rhinovirus->monitor/NGTD   TBBX: 1 MONTH: 3/4/25: A0Bx, C4D neg 3 MONTH: 2025 A0Bx GMS and C4d negative 6 MONTH: 2025 9 MONTH: 10/2025 12 MONTH: 2026   DSA: 1 WEEK: (high risk): 25: 0%, NO DSA 2 WEEK: (high risk): 25: cPRA 0% NO DSA >2000 Late 1 month SENT 3/4/25; no Dsa, CPRA0% Repeated 3/26: 0% 3 MONTH: 2025 0% Repeat 25 0% 6 MONTH: 2025 9 MONTH:10/2025 12 MONTH: 2026  ALLOSURE: Late 1 month: SENT 3/4/25: 0.19% Repeat 3/26: 0.06% Repeated 2025 3 MONTH: 2025 0.06% Repeat: Decline FEV1 2025: 0.08% 6 MONTH: 2025 9 MONTH: 10/2025 12 MONTH: 2026   SPIROMETRY  inpatient	 FVC 2.1	    FEV1 1.82 2025 inpatient	 FVC 2.21    FEV1 2.08 2025 inpatient	 FVC 1.54    FEV1 1.16 2025 inpatient	 FVC 2.07    FEV1 2.05 3/3/2025	inpatient	 FVC 1.94    FEV1 1.77 3/10/2025 inpatient	 FVC 2.04    FEV1 1.85 3/13/2025 inpatient	 FVC 2.21    FEV1 2.06 3/17/2025 inpatient	 FVC 2.06    FEV1 1.94 3/26/2025 [facility]:   FVC 2.98   FEV1 2.92 2025 [facility]:  FVC 2.70   FEV1 2.63 2025 [facility]:  FVC 2.61   FEV1 2.49 2025 [facility]:  FVC 2.53   FEV1 2.40 2025 [facility]:  FVC 2.69   FEV1 2.58 2025 [facility]     FVC 2.65    FEV1 2.58 2025 [facility]:  FVC 2.71    FEV1 2.66 2025 [facility]:  FVC 2.32    FEV1 2.25 2025 [facility]: FVC 2.87    FEV1 2.73 2025 [facility]   FVC 2.79   FEV1 2.71 2025 [facility]:  FVC 2.92   FEV1 2.85 2025 [facility]:  FVC 2.93   FEV1 2.81

## 2025-07-08 NOTE — HISTORY OF PRESENT ILLNESS
No [TextBox_4] : PRE Transplant History: 31 year old male PVOD associated with right heart failure who presented with acute hypoxic respiratory failure and cardiogenic shock. Admitted to CCU and treated with Shankar and milrinone. Initiated on Epoprostenol  CT Chest showed centrilobular ground-glass nodules, interlobular septal thickening, and mediastinal lymphadenopathy. Course complicated by pneumothorax following placement of PAC. He underwent bronchoscopy and EBUS on 12/10/24. Lymph node negative for malignancy.   Following stabilization in the ICU patient was transferred to RCU 24. Tested positive for RSV 24 s/p course of Ribavirin. Evaluated by dental 24 for jaw pain and found to have inflamed gingiva around #17 distal and lingual, with generalized plaque accumulation and gross debris/plaque s/p irrigation and cleaning. No active infection noted. RRT on 24 in setting of difficulty with Flolan infusion running through PICC Line. Flolan was subsequently changed to R IJ but then became symptomatic with tachycardia, flushing and chest pain. Patient was transferred to MICU for further monitoring. Transferred back to RCU 24. Left anterior chest wall Pandya catheter placemed for Flolan infusion 24.  Listed:  Surgery: BILATERAL LUNG TRANSPLANT 25   Donor:  CMV + / EBV + / Toxo - , PHS risk Y/N Recipient: CMV +  / EBV +  / Toxo -  Induction: simluect 20mg x2,, medrol 1 g  Total Ischemic Time: RIGHT LUN" LEFT LUN" Extubated:25  Hospital Course: Bilateral lung transplant 25. Extubated on 25. Postoperative course notable for right main stem bronchus with necrotic material and persistent air leak requiring prolonged chest tube placement. Fluid cultures 25 grew penicillium and paecilomyces for which he is on posaconazole. Also developed an acute L IJ DVT treated with lose dose Eliquis. He also tested positive for COVID initially on 3/4/25 treated with a 5 day course of Remdesivir. He remained asymptomatic. Patient discharged home 3/17/25.  TELE-Visit 3/19/2025: Initial visit is remote given positive COVID. He remains asymptomatic. Feels somewhat tired after climbing stairs at home. Also notes some chest tightness around the chest, which was present during his hospital stay. He is taking his medications as instructed using his pill box, which he had on him during the visit. He is taking strict aspiration precautions, which is also being enforced by his family. He is staying with his brother and designated caretaker Jayson, who is also present for the visit. He is ambulating daily without issue. Patient has vitals available for review, which were checked at home. Reported SpO2 97 - 99% on room air. /75. T 97.4 F. Wt 129 lbs.  CLINIC 3/26/2025: Patient presents to clinic for routine follow up appointment post hospital discharge.  Recent COVID infection, last RVP negative, SPI improved, best to date, denies respiratory complications. Complained of sore throat/esophageal pain X 3 days. Thyroid assessed, appears normal, thyroid panel and DSA/Allosure drawn. Chest tube sutures removed, incisions healing well, no signs of infection noted. Medications reviewed with patient and brother, patient and brother verbalized understanding.   CLINIC 25: Slight decline in spirometry, not concerning as home sandy trend is stable, DSA/Allosure repeated for further surveillance. Patient complained of sore thrat X1-2 days, denies sick contact, RVP swab sent. Patient also complained of numbness and tingling in hands and fingers, will reassess next week and consider adding gabapentin.  IS/Oi medications are stable; no changes this week. Routine labs to be repeated on Monday  CLINIC 25: RVP 4/3 + enterovirus and COVID. Started on a prednisone taper starting at 40 mg daily. Has further decline in spirometry from 2.63 last week to 2.49 today. Allosure was checked 3/26/25 and was 0.06%. DSA 0%. Has mild sore throat. No fevers. No shortness of breath. Patient's brother accompanied him today. Patient reports compliance with medications. His brother states that he is trying to encourage more physical activity.  Readmitted 4/10 - 25 for concern for respiratory symptoms, COVID infection with worsening PFTs. CT chest negative, + enterovirus on RVP. Covid was negative. Transplant ID consulted. Completed empiric course of levaquin inpatient.  CLINIC 2025: Mr. Buck was seen and examined by me and the ACP on 2025 at [address] in a transplant office and I reviewed his data which was extensive the day before the scheduled visitation completely including lab data and radiographic information.  CLINIC 2025: CLINIC 2025: Patient clinically stable, spirometry improved, denies respiratory complications. Cellecpt increased to 500 mg BID. Posa decreased to 200mg as trough is 2.7. Metformin 500mg daily started by Dr. Claire on ; discontinued today as Metformin is not a preferred drug of choice for post-transplant patients. an appointment was facilitated with Dr. Toro on 25.  CLINIC 2025: Patient presents for routine follow up appointment. Reports feeling well. No respiratory complaints. Has intermittent feeling of chest tightness not associated with dyspnea. Also notes stiff legs in the middle of the night which improves during daytime. He reports compliance with medications.  CLINIC 2025: Patient presents for routine follow up appointment. Spirometry improved from last. Denies respiratory complications. Complained of neuropathy around incision sites, incisions are healed, no signs of infection. Patient also endorses leg cramps and stiffness., increase hydration with electrolytes encouraged. Patient states he has moved back to parent's house with his wife and son. Reports wife now helps him with medication and pill box.  CLINIC 2025: Spirometry declined. Stable respiratory status. Denies SOB or cough. Fell yesterday with associated head strike. Has bruise on scalp. Painful to touch. Also has mild right knee pain. Denies LOC. Denies headache.  Sent patient to the ER for CT of head.  DSA/Allosure drawn and sent today for declined Spirometry. Patient to follow up with Dr. Garcia to evaluate the need of Eliquis. Tacrolimus increased to 0.5mg BID and Cellcept increased to 750mg BID. Medication changes reviewed with patient, med-action updated and provided.    CLINIC 25: Spirometry improved, denies respiratory complications, reports doing well. Patient stated having right leg and right-hand cramps and stiffness, recommended to increase hydration. Pending appointment for Duplex to re-evaluate Left IJ DVT and possibly discontinuing Eliquis.   CLINIC 2025:  Patients' spirometry stable, no complaints of respiratory issues. Tacro increased to 1mg BID, c/o muscle spasms, hydration encouraged. PAtient was referred to Neuropsychologist for cognitive impairment and patient has refused appointment. Lower ext dopplers completed- benign; Upper extremity dopplers scheduled for today.   CLINIC 2025: Spirometry improved, no respiratory complaints, reports doing well. Patient continues to complain of muscle spasm of B/L LE at times, hydration encourage and to remain active. Discussed with patient to be compliant with appointments. Updated med-action provided to patient.    CLINIC 2025: Spirometry stable, denies any respiratory complications, reports doing well. Lumbar sacral MRI ordered for continued muscle spasms and numbness to lower extremities. Patient reported heat rash to chest from being outside walking, Calamine lotion or Benadryl cream OTC recommended. Decreased Cellcept to 250mg BID due to neutropenia. updated Med-action provided to patient.     DONOR CULTURES:  CHECK UNET POD 1,3,7,10,14 2025: respiratory cx: pseudomonas fluoresecens 25 blood cx: ngtd 25 R BAL: <10k staph aureus 25 L BAL: <10k staph aureus 25 urine cx: ngtd 25: bld cx: staph epi?? - I'm not seeing this on unet?  CULTURES: : OR: rare yeast, rare mold, + pseudomonas flurosecens : OR: candida parapsilosis, s/p  caspofungin : OR: rare penicillum  25 body fluid: rare candida, rare pseudomonas fluorescens 25 body fluid cx: rare candida parapsilosis, rare penicillium, rare paecilomyces 25: BAL: <10k growth 25 BAL: ngtd 25: NGTD 3/4/25: BAL + COVID (remdesivir 3/5 - 3/10) 3/7/25: RVP + COVID 3/13: RVP + COVID  2025: Rhinovirus->monitor/NGTD   TBBX: 1 MONTH: 3/4/25: A0Bx, C4D neg 3 MONTH: 2025 A0Bx GMS and C4d negative 6 MONTH: 2025 9 MONTH: 10/2025 12 MONTH: 2026   DSA: 1 WEEK: (high risk): 25: 0%, NO DSA 2 WEEK: (high risk): 25: cPRA 0% NO DSA >2000 Late 1 month SENT 3/4/25; no Dsa, CPRA0% Repeated 3/26: 0% 3 MONTH: 2025 0% Repeat 25 0% 6 MONTH: 2025 9 MONTH:10/2025 12 MONTH: 2026  ALLOSURE: Late 1 month: SENT 3/4/25: 0.19% Repeat 3/26: 0.06% Repeated 2025 3 MONTH: 2025 0.06% Repeat: Decline FEV1 2025: 0.08% 6 MONTH: 2025 9 MONTH: 10/2025 12 MONTH: 2026   SPIROMETRY  inpatient	 FVC 2.1	    FEV1 1.82 2025 inpatient	 FVC 2.21    FEV1 2.08 2025 inpatient	 FVC 1.54    FEV1 1.16 2025 inpatient	 FVC 2.07    FEV1 2.05 3/3/2025	inpatient	 FVC 1.94    FEV1 1.77 3/10/2025 inpatient	 FVC 2.04    FEV1 1.85 3/13/2025 inpatient	 FVC 2.21    FEV1 2.06 3/17/2025 inpatient	 FVC 2.06    FEV1 1.94 3/26/2025 [facility]:   FVC 2.98   FEV1 2.92 2025 [facility]:  FVC 2.70   FEV1 2.63 2025 [facility]:  FVC 2.61   FEV1 2.49 2025 [facility]:  FVC 2.53   FEV1 2.40 2025 [facility]:  FVC 2.69   FEV1 2.58 2025 [facility]     FVC 2.65    FEV1 2.58 2025 [facility]:  FVC 2.71    FEV1 2.66 2025 [facility]:  FVC 2.32    FEV1 2.25 2025 [facility]: FVC 2.87    FEV1 2.73 2025 [facility]   FVC 2.79   FEV1 2.71 2025 [facility]:  FVC 2.92   FEV1 2.85 2025 [facility]:  FVC 2.93   FEV1 2.81

## 2025-07-08 NOTE — ASSESSMENT
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 S/P Bilateral Lung Transplant 1/31/2025   #Lung TXP - 2/1/25 body fluid cx: rare candida parapsilosis, rare penicillium, rare paecilomyces-> Posa started - 3/4/2025: COVID-> admitted for remdesivir infusion  #IS GOAL 10-12 Tacro level 6/30/25: 14.0  Tacrolimus 1/0.5 mg BID - Prednisone 17.5 mg  - Cellcept 500 mg BID, decreased to 250mg BID.   #OI - Valcyte 450 mg BID - Bactrim SS daily - Posaconazole 200 mg daily   #GI Hx of GERD - Protonix 40 mg daily  #HEM Left IJ DVT Plan: discontinue after 3 months (5/13/25) - Eliquis 2.5 mg BID, started 2/13/25 Repeat duplex prior to stopping    #ID Latent TB therapy - Isoniazid 300 mg daily - Pyridoxine 50 mg daily  #ENDO DMT2 - Januvia 100mg daily, started by endo 4/30/25    FOLLOW UP - Routine weekly labs 7/02/2025 - Mendez reviewed, stable - 6-Month bronch 7/17/2025 - Upcoming appointment with Dr. Garcia  - Continue to follow up with Transplant ID. - Continue to follow up with endocrinology - Continue to follow up with Dr. Smith, Lipidologist       RTC in 1 weeks with [place] & CXR  All questions answered, used teach back method, patient verbalized understanding.  Above discussed with Dr. Sweeney

## 2025-07-08 NOTE — ASSESSMENT
[FreeTextEntry1] : 30yo male newly dx with PAH & RV failure, former smoker. S/P Bilateral Lung Transplant 1/31/2025   #Lung TXP - 2/1/25 body fluid cx: rare candida parapsilosis, rare penicillium, rare paecilomyces-> Posa started - 3/4/2025: COVID-> admitted for remdesivir infusion  #IS GOAL 10-12 Tacro level 6/30/25: 14.0  Tacrolimus 1/0.5 mg BID - Prednisone 17.5 mg  - Cellcept 500 mg BID, decreased to 250mg BID.   #OI - Valcyte 450 mg BID - Bactrim SS daily - Posaconazole 200 mg daily   #GI Hx of GERD - Protonix 40 mg daily  #HEM Left IJ DVT Plan: discontinue after 3 months (5/13/25) - Eliquis 2.5 mg BID, started 2/13/25 Repeat duplex prior to stopping    #ID Latent TB therapy - Isoniazid 300 mg daily - Pyridoxine 50 mg daily  #ENDO DMT2 - Januvia 100mg daily, started by endo 4/30/25    FOLLOW UP - Routine weekly labs 7/02/2025 - Mendez reviewed, stable - 6-Month bronch 7/17/2025 - Upcoming appointment with Dr. Garcia  - Continue to follow up with Transplant ID. - Continue to follow up with endocrinology - Continue to follow up with Dr. Smith, Lipidologist       RTC in 1 weeks with [place] & CXR  All questions answered, used teach back method, patient verbalized understanding.  Above discussed with Dr. Sweeney

## 2025-07-08 NOTE — PHYSICAL EXAM
[No Acute Distress] : no acute distress [Normal Oropharynx] : normal oropharynx [Normal Appearance] : normal appearance [No Neck Mass] : no neck mass [Normal Rate/Rhythm] : normal rate/rhythm [No Resp Distress] : no resp distress [No Abnormalities] : no abnormalities [Benign] : benign [Normal Gait] : normal gait [No Edema] : no edema [No Focal Deficits] : no focal deficits [Oriented x3] : oriented x3 [TextBox_125] : rash to chest

## 2025-07-10 NOTE — END OF VISIT
[Time Spent: ___ minutes] : I have spent [unfilled] minutes of time on the encounter which excludes teaching and separately reported services. [FreeTextEntry3] : 30 year old male presented initially with cardiogenic shock and acute on chronic RH failure in setting of severe Group 1 PAH / PVOD s/p bilateral LT 1/31/2025.  Last TBBX 4/17/25 A0BX Clinically stable, on room air Spirometry: FEV1 stable at 2.81L. Last FEV1 2.81 (7/2/25). PE: CTAB. No LE edema. Labs 7/7/25 reviewed: Tacrolimus 17.2. Cr 1.45. CMV and EBV PCR negative. CXR 7/7/25: clear Immunosuppression: Tacrolimus reduced to 0.5 mg BID this week. Cellcept 250 mg BID (reduced for leukopenia). Prednisone 17.5 mg daily OI PPX: Bactrim, Valcyte, Posaconazole (BAL 2/1 growing penicillium) LTBI: INH/B6. Transplant ID follow up GERD: Aspiration precautions  RTC 1 week

## 2025-07-10 NOTE — HISTORY OF PRESENT ILLNESS
[TextBox_4] : PRE Transplant History: 31 year old male PVOD associated with right heart failure who presented with acute hypoxic respiratory failure and cardiogenic shock. Admitted to CCU and treated with Shankar and milrinone. Initiated on Epoprostenol  CT Chest showed centrilobular ground-glass nodules, interlobular septal thickening, and mediastinal lymphadenopathy. Course complicated by pneumothorax following placement of PAC. He underwent bronchoscopy and EBUS on 12/10/24. Lymph node negative for malignancy.  Following stabilization in the ICU patient was transferred to RCU 24. Tested positive for RSV 24 s/p course of Ribavirin. Evaluated by dental 24 for jaw pain and found to have inflamed gingiva around #17 distal and lingual, with generalized plaque accumulation and gross debris/plaque s/p irrigation and cleaning. No active infection noted. RRT on 24 in setting of difficulty with Flolan infusion running through PICC Line. Flolan was subsequently changed to R IJ but then became symptomatic with tachycardia, flushing and chest pain. Patient was transferred to MICU for further monitoring. Transferred back to RCU 24. Left anterior chest wall Pandya catheter placemed for Flolan infusion 24.  Listed: Surgery: BILATERAL LUNG TRANSPLANT 25 Donor: CMV + / EBV + / Toxo - , PHS risk Y/N Recipient: CMV + / EBV + / Toxo - Induction: simluect 20mg x2, medrol 1 g  Total Ischemic Time: RIGHT LUN" LEFT LUN" Extubated:25  Hospital Course: Bilateral lung transplant 25. Extubated on 25. Postoperative course notable for right main stem bronchus with necrotic material and persistent air leak requiring prolonged chest tube placement. Fluid cultures 25 grew penicillium and paecilomyces for which he is on posaconazole. Also developed an acute L IJ DVT treated with lose dose Eliquis. He also tested positive for COVID initially on 3/4/25 treated with a 5 day course of Remdesivir. He remained asymptomatic. Patient discharged home 3/17/25.  TELE-Visit 3/19/2025: Initial visit is remote given positive COVID. He remains asymptomatic. Feels somewhat tired after climbing stairs at home. Also notes some chest tightness around the chest, which was present during his hospital stay. He is taking his medications as instructed using his pill box, which he had on him during the visit. He is taking strict aspiration precautions, which is also being enforced by his family. He is staying with his brother and designated caretaker Jayson, who is also present for the visit. He is ambulating daily without issue. Patient has vitals available for review, which were checked at home. Reported SpO2 97 - 99% on room air. /75. T 97.4 F. Wt 129 lbs.  CLINIC 3/26/2025: Patient presents to clinic for routine follow up appointment post hospital discharge. Recent COVID infection, last RVP negative, SPI improved, best to date, denies respiratory complications. Complained of sore throat/esophageal pain X 3 days. Thyroid assessed, appears normal, thyroid panel and DSA/Allosure drawn. Chest tube sutures removed, incisions healing well, no signs of infection noted. Medications reviewed with patient and brother, patient and brother verbalized understanding.  CLINIC 25: Slight decline in spirometry, not concerning as home sandy trend is stable, DSA/Allosure repeated for further surveillance. Patient complained of sore thrat X1-2 days, denies sick contact, RVP swab sent. Patient also complained of numbness and tingling in hands and fingers, will reassess next week and consider adding gabapentin. IS/Oi medications are stable; no changes this week. Routine labs to be repeated on Monday  CLINIC 25: RVP 4/3 + enterovirus and COVID. Started on a prednisone taper starting at 40 mg daily. Has further decline in spirometry from 2.63 last week to 2.49 today. Allosure was checked 3/26/25 and was 0.06%. DSA 0%. Has mild sore throat. No fevers. No shortness of breath. Patient's brother accompanied him today. Patient reports compliance with medications. His brother states that he is trying to encourage more physical activity.  Readmitted 4/10 - 25 for concern for respiratory symptoms, COVID infection with worsening PFTs. CT chest negative, + enterovirus on RVP. Covid was negative. Transplant ID consulted. Completed empiric course of levaquin inpatient.  CLINIC 2025: Mr. Buck was seen and examined by me and the ACP on 2025 [facility] [address] [facility] [address] in a transplant office and I reviewed his data which was extensive the day before the scheduled visitation completely including lab data and radiographic information.  CLINIC 2025: CLINIC 2025: Patient clinically stable, spirometry improved, denies respiratory complications. Cellecpt increased to 500 mg BID. Posa decreased to 200mg as trough is 2.7. Metformin 500mg daily started by Dr. Claire on ; discontinued today as Metformin is not a preferred drug of choice for post-transplant patients. an appointment was facilitated with Dr. Toro on 25.  CLINIC 2025: Patient presents for routine follow up appointment. Reports feeling well. No respiratory complaints. Has intermittent feeling of chest tightness not associated with dyspnea. Also notes stiff legs in the middle of the night which improves during daytime. He reports compliance with medications.  CLINIC 2025: Patient presents for routine follow up appointment. Spirometry improved from last. Denies respiratory complications. Complained of neuropathy around incision sites, incisions are healed, no signs of infection. Patient also endorses leg cramps and stiffness., increase hydration with electrolytes encouraged. Patient states he has moved back to parent's house with his wife and son. Reports wife now helps him with medication and pill box.  CLINIC 2025: Spirometry declined. Stable respiratory status. Denies SOB or cough. Fell yesterday with associated head strike. Has bruise on scalp. Painful to touch. Also has mild right knee pain. Denies LOC. Denies headache. Sent patient to the ER for CT of head. DSA/Allosure drawn and sent today for declined Spirometry. Patient to follow up with Dr. Garcia to evaluate the need of Eliquis. Tacrolimus increased to 0.5mg BID and Cellcept increased to 750mg BID. Medication changes reviewed with patient, med-action updated and provided.  CLINIC 25: Spirometry improved, denies respiratory complications, reports doing well. Patient stated having right leg and right-hand cramps and stiffness, recommended to increase hydration. Pending appointment for Duplex to re-evaluate Left IJ DVT and possibly discontinuing Eliquis.  CLINIC 2025: Patients' spirometry stable, no complaints of respiratory issues. Tacro increased to 1mg BID, c/o muscle spasms, hydration encouraged. PAtient was referred to Neuropsychologist for cognitive impairment and patient has refused appointment. Lower ext dopplers completed- benign; Upper extremity dopplers scheduled for today.  CLINIC 2025: Spirometry improved, no respiratory complaints, reports doing well. Patient continues to complain of muscle spasm of B/L LE at times, hydration encourage and to remain active. Discussed with patient to be compliant with appointments. Updated med-action provided to patient.  CLINIC 2025: Spirometry stable, denies any respiratory complications, reports doing well. Lumbar sacral MRI ordered for continued muscle spasms and numbness to lower extremities. Patient reported heat rash to chest from being outside walking, Calamine lotion or Benadryl cream OTC recommended. Decreased Cellcept to 250mg BID due to neutropenia. updated Med-action provided to patient.  CLINIC 25: Spirometry stable, denies any respiratory complications, reports doing well. Lumbar sacral MRI ordered for continued muscle spasms and numbness to lower extremities. No changes otherwise. Patient will repeat Tacrolimus level on 7/10/25.    DONOR CULTURES: CHECK UNET POD 1,3,7,10,14 2025: respiratory cx: pseudomonas fluoresecens 25 blood cx: ngtd 25 R BAL: <10k staph aureus 25 L BAL: <10k staph aureus 25 urine cx: ngtd 25: bld cx: staph epi?? - I'm not seeing this on unet?  CULTURES: : OR: rare yeast, rare mold, + pseudomonas flurosecens : OR: candida parapsilosis, s/p caspofungin : OR: rare penicillum 25 body fluid: rare candida, rare pseudomonas fluorescens 25 body fluid cx: rare candida parapsilosis, rare penicillium, rare paecilomyces 25: BAL: <10k growth 25 BAL: ngtd 25: NGTD 3/4/25: BAL + COVID (remdesivir 3/5 - 3/10) 3/7/25: RVP + COVID 3/13: RVP + COVID 2025: Rhinovirus->monitor/NGTD  TBBX: 1 MONTH: 3/4/25: A0Bx, C4D neg 3 MONTH: 2025 A0Bx GMS and C4d negative 6 MONTH: 2025 9 MONTH: 10/2025 12 MONTH: 2026   DSA: 1 WEEK: (high risk): 25: 0%, NO DSA 2 WEEK: (high risk): 25: cPRA 0% NO DSA > Late 1 month SENT 3/4/25; no Dsa, CPRA0% Repeated 3/26: 0% 3 MONTH: 2025 0% Repeat 25 0% 6 MONTH: 2025 9 MONTH:10/2025 12 MONTH: 2026  ALLOSURE: Late 1 month: SENT 3/4/25: 0.19% Repeat 3/26: 0.06% Repeated 2025 3 MONTH: 2025 0.06% Repeat: Decline FEV1 2025: 0.08% 6 MONTH: 2025 9 MONTH: 10/2025 12 MONTH: 2026   SPIROMETRY  inpatient  FVC 2.1  FEV1 1.82 2025 inpatient  FVC 2.21 FEV1 2.08 2025 inpatient  FVC 1.54 FEV1 1.16 2025 inpatient  FVC 2.07 FEV1 2.05 3/3/2025 inpatient  FVC 1.94 FEV1 1.77 3/10/2025 inpatient  FVC 2.04 FEV1 1.85 3/13/2025 inpatient  FVC 2.21 FEV1 2.06 3/17/2025 inpatient  FVC 2.06 FEV1 1.94 3/26/2025 [facility]: FVC 2.98 FEV1 2.92 2025 800 [facility]: FVC 2.70 FEV1 2.63 2025 800 Clinic: FVC 2.61 FEV1 2.49 2025 800 Clinic: FVC 2.53 FEV1 2.40 2025 800 [facility]: FVC 2.69 FEV1 2.58 2025 410 Clinic FVC 2.65 FEV1 2.58 2025 800 Clinic: FVC 2.71 FEV1 2.66 2025 800 Clinic: FVC 2.32 FEV1 2.25 2025 800 Clinic: FVC 2.87 FEV1 2.73 2025 800 Clinic FVC 2.79 FEV1 2.71 2025 800 Clinic: FVC 2.92 FEV1 2.85 2025 800 Clinic: FVC 2.93 FEV1 2.81. 2025 800 [facility]: FVC 2.96 FEV1 2.81

## 2025-07-10 NOTE — ASSESSMENT
[FreeTextEntry1] : 30yo male newly dx with PAH & RV failure, former smoker. S/P Bilateral Lung Transplant 1/31/2025   #Lung TXP - 2/1/25 body fluid cx: rare candida parapsilosis, rare penicillium, rare paecilomyces-> Posa started - 3/4/2025: COVID-> admitted for remdesivir infusion  #IS GOAL 10-12 Tacro level 7/7/25 17.5  Tacrolimus 1/0.5mg daily, decreased to 0.5 mg BID - Prednisone 17.5 mg - Cellcept 500 mg BID, decreased to 250mg BID for leukpenia  #OI - Valcyte 450 mg BID - Bactrim SS daily - Posaconazole 200 mg daily   #GI Hx of GERD - Protonix 40 mg daily  #HEM Left IJ DVT Plan: discontinue after 3 months (5/13/25) - Eliquis 2.5 mg BID, started 2/13/25 Repeat duplex prior to stopping   #ID Latent TB therapy - Isoniazid 300 mg daily - Pyridoxine 50 mg daily  #ENDO DMT2 - Januvia 100mg daily, started by endo 4/30/25    FOLLOW UP - Routine weekly labs 7/14/2025 - Spirometry reviewed, stable - Upcoming 6-month bronchoscopy 7/17/2025 - Upcoming appointment with Dr. Garcia - Continue to follow up with Transplant ID - Continue to follow up with Endocrinology - Continue to follow up with Dr. Smith - Lipidologist      RTC in 1 weeks with [place] & CXR  All questions answered, used teach back method, patient verbalized understanding.  Above discussed with Dr. Brito

## 2025-07-10 NOTE — HISTORY OF PRESENT ILLNESS
[TextBox_4] : PRE Transplant History: 31 year old male PVOD associated with right heart failure who presented with acute hypoxic respiratory failure and cardiogenic shock. Admitted to CCU and treated with Shankar and milrinone. Initiated on Epoprostenol  CT Chest showed centrilobular ground-glass nodules, interlobular septal thickening, and mediastinal lymphadenopathy. Course complicated by pneumothorax following placement of PAC. He underwent bronchoscopy and EBUS on 12/10/24. Lymph node negative for malignancy.  Following stabilization in the ICU patient was transferred to RCU 24. Tested positive for RSV 24 s/p course of Ribavirin. Evaluated by dental 24 for jaw pain and found to have inflamed gingiva around #17 distal and lingual, with generalized plaque accumulation and gross debris/plaque s/p irrigation and cleaning. No active infection noted. RRT on 24 in setting of difficulty with Flolan infusion running through PICC Line. Flolan was subsequently changed to R IJ but then became symptomatic with tachycardia, flushing and chest pain. Patient was transferred to MICU for further monitoring. Transferred back to RCU 24. Left anterior chest wall Pandya catheter placemed for Flolan infusion 24.  Listed: Surgery: BILATERAL LUNG TRANSPLANT 25 Donor: CMV + / EBV + / Toxo - , PHS risk Y/N Recipient: CMV + / EBV + / Toxo - Induction: simluect 20mg x2, medrol 1 g  Total Ischemic Time: RIGHT LUN" LEFT LUN" Extubated:25  Hospital Course: Bilateral lung transplant 25. Extubated on 25. Postoperative course notable for right main stem bronchus with necrotic material and persistent air leak requiring prolonged chest tube placement. Fluid cultures 25 grew penicillium and paecilomyces for which he is on posaconazole. Also developed an acute L IJ DVT treated with lose dose Eliquis. He also tested positive for COVID initially on 3/4/25 treated with a 5 day course of Remdesivir. He remained asymptomatic. Patient discharged home 3/17/25.  TELE-Visit 3/19/2025: Initial visit is remote given positive COVID. He remains asymptomatic. Feels somewhat tired after climbing stairs at home. Also notes some chest tightness around the chest, which was present during his hospital stay. He is taking his medications as instructed using his pill box, which he had on him during the visit. He is taking strict aspiration precautions, which is also being enforced by his family. He is staying with his brother and designated caretaker Jayson, who is also present for the visit. He is ambulating daily without issue. Patient has vitals available for review, which were checked at home. Reported SpO2 97 - 99% on room air. /75. T 97.4 F. Wt 129 lbs.  CLINIC 3/26/2025: Patient presents to clinic for routine follow up appointment post hospital discharge. Recent COVID infection, last RVP negative, SPI improved, best to date, denies respiratory complications. Complained of sore throat/esophageal pain X 3 days. Thyroid assessed, appears normal, thyroid panel and DSA/Allosure drawn. Chest tube sutures removed, incisions healing well, no signs of infection noted. Medications reviewed with patient and brother, patient and brother verbalized understanding.  CLINIC 25: Slight decline in spirometry, not concerning as home sandy trend is stable, DSA/Allosure repeated for further surveillance. Patient complained of sore thrat X1-2 days, denies sick contact, RVP swab sent. Patient also complained of numbness and tingling in hands and fingers, will reassess next week and consider adding gabapentin. IS/Oi medications are stable; no changes this week. Routine labs to be repeated on Monday  CLINIC 25: RVP 4/3 + enterovirus and COVID. Started on a prednisone taper starting at 40 mg daily. Has further decline in spirometry from 2.63 last week to 2.49 today. Allosure was checked 3/26/25 and was 0.06%. DSA 0%. Has mild sore throat. No fevers. No shortness of breath. Patient's brother accompanied him today. Patient reports compliance with medications. His brother states that he is trying to encourage more physical activity.  Readmitted 4/10 - 25 for concern for respiratory symptoms, COVID infection with worsening PFTs. CT chest negative, + enterovirus on RVP. Covid was negative. Transplant ID consulted. Completed empiric course of levaquin inpatient.  CLINIC 2025: Mr. Buck was seen and examined by me and the ACP on 20[facility] [address] [facility] [address] in a transplant office and I reviewed his data which was extensive the day before the scheduled visitation completely including lab data and radiographic information.  CLINIC 2025: CLINIC 2025: Patient clinically stable, spirometry improved, denies respiratory complications. Cellecpt increased to 500 mg BID. Posa decreased to 200mg as trough is 2.7. Metformin 500mg daily started by Dr. Claire on ; discontinued today as Metformin is not a preferred drug of choice for post-transplant patients. an appointment was facilitated with Dr. Toro on 25.  CLINIC 2025: Patient presents for routine follow up appointment. Reports feeling well. No respiratory complaints. Has intermittent feeling of chest tightness not associated with dyspnea. Also notes stiff legs in the middle of the night which improves during daytime. He reports compliance with medications.  CLINIC 2025: Patient presents for routine follow up appointment. Spirometry improved from last. Denies respiratory complications. Complained of neuropathy around incision sites, incisions are healed, no signs of infection. Patient also endorses leg cramps and stiffness., increase hydration with electrolytes encouraged. Patient states he has moved back to parent's house with his wife and son. Reports wife now helps him with medication and pill box.  CLINIC 2025: Spirometry declined. Stable respiratory status. Denies SOB or cough. Fell yesterday with associated head strike. Has bruise on scalp. Painful to touch. Also has mild right knee pain. Denies LOC. Denies headache. Sent patient to the ER for CT of head. DSA/Allosure drawn and sent today for declined Spirometry. Patient to follow up with Dr. Garcia to evaluate the need of Eliquis. Tacrolimus increased to 0.5mg BID and Cellcept increased to 750mg BID. Medication changes reviewed with patient, med-action updated and provided.  CLINIC 25: Spirometry improved, denies respiratory complications, reports doing well. Patient stated having right leg and right-hand cramps and stiffness, recommended to increase hydration. Pending appointment for Duplex to re-evaluate Left IJ DVT and possibly discontinuing Eliquis.  CLINIC 2025: Patients' spirometry stable, no complaints of respiratory issues. Tacro increased to 1mg BID, c/o muscle spasms, hydration encouraged. PAtient was referred to Neuropsychologist for cognitive impairment and patient has refused appointment. Lower ext dopplers completed- benign; Upper extremity dopplers scheduled for today.  CLINIC 2025: Spirometry improved, no respiratory complaints, reports doing well. Patient continues to complain of muscle spasm of B/L LE at times, hydration encourage and to remain active. Discussed with patient to be compliant with appointments. Updated med-action provided to patient.  CLINIC 2025: Spirometry stable, denies any respiratory complications, reports doing well. Lumbar sacral MRI ordered for continued muscle spasms and numbness to lower extremities. Patient reported heat rash to chest from being outside walking, Calamine lotion or Benadryl cream OTC recommended. Decreased Cellcept to 250mg BID due to neutropenia. updated Med-action provided to patient.  CLINIC 25: Spirometry stable, denies any respiratory complications, reports doing well. Lumbar sacral MRI ordered for continued muscle spasms and numbness to lower extremities. No changes otherwise. Patient will repeat Tacrolimus level on 7/10/25.    DONOR CULTURES: CHECK UNET POD 1,3,7,10,14 2025: respiratory cx: pseudomonas fluoresecens 25 blood cx: ngtd 25 R BAL: <10k staph aureus 25 L BAL: <10k staph aureus 25 urine cx: ngtd 25: bld cx: staph epi?? - I'm not seeing this on unet?  CULTURES: : OR: rare yeast, rare mold, + pseudomonas flurosecens : OR: candida parapsilosis, s/p caspofungin : OR: rare penicillum 25 body fluid: rare candida, rare pseudomonas fluorescens 25 body fluid cx: rare candida parapsilosis, rare penicillium, rare paecilomyces 25: BAL: <10k growth 25 BAL: ngtd 25: NGTD 3/4/25: BAL + COVID (remdesivir 3/5 - 3/10) 3/7/25: RVP + COVID 3/13: RVP + COVID 2025: Rhinovirus->monitor/NGTD  TBBX: 1 MONTH: 3/4/25: A0Bx, C4D neg 3 MONTH: 2025 A0Bx GMS and C4d negative 6 MONTH: 2025 9 MONTH: 10/2025 12 MONTH: 2026   DSA: 1 WEEK: (high risk): 25: 0%, NO DSA 2 WEEK: (high risk): 25: cPRA 0% NO DSA > Late 1 month SENT 3/4/25; no Dsa, CPRA0% Repeated 3/26: 0% 3 MONTH: 2025 0% Repeat 25 0% 6 MONTH: 2025 9 MONTH:10/2025 12 MONTH: 2026  ALLOSURE: Late 1 month: SENT 3/4/25: 0.19% Repeat 3/26: 0.06% Repeated 2025 3 MONTH: 2025 0.06% Repeat: Decline FEV1 2025: 0.08% 6 MONTH: 2025 9 MONTH: 10/2025 12 MONTH: 2026   SPIROMETRY  inpatient  FVC 2.1  FEV1 1.82 2025 inpatient  FVC 2.21 FEV1 2.08 2025 inpatient  FVC 1.54 FEV1 1.16 2025 inpatient  FVC 2.07 FEV1 2.05 3/3/2025 inpatient  FVC 1.94 FEV1 1.77 3/10/2025 inpatient  FVC 2.04 FEV1 1.85 3/13/2025 inpatient  FVC 2.21 FEV1 2.06 3/17/2025 inpatient  FVC 2.06 FEV1 1.94 3/26/2025 [facility]: FVC 2.98 FEV1 2.92 2025 800 [facility]: FVC 2.70 FEV1 2.63 2025 800 Clinic: FVC 2.61 FEV1 2.49 2025 800 Clinic: FVC 2.53 FEV1 2.40 2025 800 Clinic: FVC 2.69 FEV1 2.58 2025 410 Clinic FVC 2.65 FEV1 2.58 2025 800 Clinic: FVC 2.71 FEV1 2.66 2025 800 Clinic: FVC 2.32 FEV1 2.25 2025 800 Clinic: FVC 2.87 FEV1 2.73 2025 800 Clinic FVC 2.79 FEV1 2.71 2025 800 Clinic: FVC 2.92 FEV1 2.85 2025 800 Clinic: FVC 2.93 FEV1 2.81. 2025 800 [facility]: FVC 2.96 FEV1 2.81

## 2025-07-10 NOTE — PHYSICAL EXAM
[No Acute Distress] : no acute distress [Normal Oropharynx] : normal oropharynx [Normal Rate/Rhythm] : normal rate/rhythm [Normal S1, S2] : normal s1, s2 [No Murmurs] : no murmurs [No Resp Distress] : no resp distress [Clear to Auscultation Bilaterally] : clear to auscultation bilaterally [Benign] : benign [No Cyanosis] : no cyanosis [No Edema] : no edema [FROM] : FROM [Oriented x3] : oriented x3 [Normal Appearance] : normal appearance [No Abnormalities] : no abnormalities [Normal Gait] : normal gait [Normal Color/ Pigmentation] : normal color/ pigmentation [No Focal Deficits] : no focal deficits [TextBox_132] : Normal LE strength bilaterally

## 2025-07-10 NOTE — ASSESSMENT
[FreeTextEntry1] : 30yo male newly dx with PAH & RV failure, former smoker. S/P Bilateral Lung Transplant 1/31/2025   #Lung TXP - 2/1/25 body fluid cx: rare candida parapsilosis, rare penicillium, rare paecilomyces-> Posa started - 3/4/2025: COVID-> admitted for remdesivir infusion  #IS GOAL 10-12 Tacro level 7/7/25 17.5  Tacrolimus 1/0.5mg daily, decreased to 0.5 mg BID - Prednisone 17.5 mg - Cellcept 500 mg BID, decreased to 250mg BID for leukpenia  #OI - Valcyte 450 mg BID - Bactrim SS daily - Posaconazole 200 mg daily   #GI Hx of GERD - Protonix 40 mg daily  #HEM Left IJ DVT Plan: discontinue after 3 months (5/13/25) - Eliquis 2.5 mg BID, started 2/13/25 Repeat duplex prior to stopping   #ID Latent TB therapy - Isoniazid 300 mg daily - Pyridoxine 50 mg daily  #ENDO DMT2 - Januvia 100mg daily, started by endo 4/30/25    FOLLOW UP - Routine weekly labs 7/14/2025 - Spirometry reviewed, stable - Upcoming 6-month bronchoscopy 7/17/2025 - Upcoming appointment with Dr. Garcia - Continue to follow up with Transplant ID - Continue to follow up with Endocrinology - Continue to follow up with Dr. Smith - Lipidologist      RTC in 1 weeks with [place] & CXR  All questions answered, used teach back method, patient verbalized understanding.  Above discussed with Dr. Brito